# Patient Record
Sex: MALE | Race: WHITE | Employment: OTHER | URBAN - METROPOLITAN AREA
[De-identification: names, ages, dates, MRNs, and addresses within clinical notes are randomized per-mention and may not be internally consistent; named-entity substitution may affect disease eponyms.]

---

## 2017-05-26 ENCOUNTER — OFFICE VISIT (OUTPATIENT)
Dept: INTERNAL MEDICINE CLINIC | Age: 52
End: 2017-05-26

## 2017-05-26 VITALS
OXYGEN SATURATION: 98 % | DIASTOLIC BLOOD PRESSURE: 80 MMHG | SYSTOLIC BLOOD PRESSURE: 132 MMHG | BODY MASS INDEX: 31.32 KG/M2 | RESPIRATION RATE: 14 BRPM | TEMPERATURE: 98.4 F | WEIGHT: 244 LBS | HEART RATE: 80 BPM | HEIGHT: 74 IN

## 2017-05-26 DIAGNOSIS — Z87.39 HISTORY OF GOUT: ICD-10-CM

## 2017-05-26 DIAGNOSIS — E78.5 HYPERLIPIDEMIA LDL GOAL <130: ICD-10-CM

## 2017-05-26 DIAGNOSIS — I10 ESSENTIAL HYPERTENSION WITH GOAL BLOOD PRESSURE LESS THAN 140/90: Primary | ICD-10-CM

## 2017-05-26 RX ORDER — NIFEDIPINE 60 MG/1
60 TABLET, EXTENDED RELEASE ORAL DAILY
Qty: 30 TAB | Refills: 3 | Status: SHIPPED | OUTPATIENT
Start: 2017-05-26 | End: 2017-06-16 | Stop reason: DRUGHIGH

## 2017-05-26 RX ORDER — INDOMETHACIN 50 MG/1
CAPSULE ORAL
Qty: 90 CAP | Refills: 1 | Status: SHIPPED | OUTPATIENT
Start: 2017-05-26 | End: 2018-05-23 | Stop reason: SDUPTHER

## 2017-05-26 RX ORDER — LISINOPRIL 20 MG/1
TABLET ORAL
Qty: 90 TAB | Refills: 3 | Status: SHIPPED | OUTPATIENT
Start: 2017-05-26 | End: 2018-08-06 | Stop reason: SDUPTHER

## 2017-05-26 RX ORDER — CYCLOBENZAPRINE HCL 10 MG
10 TABLET ORAL
Qty: 60 TAB | Refills: 0 | Status: SHIPPED | OUTPATIENT
Start: 2017-05-26 | End: 2017-06-16 | Stop reason: ALTCHOICE

## 2017-05-26 RX ORDER — AMLODIPINE BESYLATE 10 MG/1
10 TABLET ORAL DAILY
Qty: 90 TAB | Refills: 3 | Status: CANCELLED | OUTPATIENT
Start: 2017-05-26

## 2017-05-26 NOTE — PROGRESS NOTES
1. Have you been to the ER, urgent care clinic or hospitalized since your last visit? YES. [facility] on 8/24/2016 for Rib pain on right side. 2. Have you seen or consulted any other health care providers outside of the [address] [place] [address] since your last visit (Include any pap smears or colon screening)? NO      Do you have an Advanced Directive? YES     Would you like information on Advanced Directives?  NO

## 2017-05-26 NOTE — PROGRESS NOTES
Rayne Tesfaye III,born 1965, is a 46 y.o. male, who is seen today for evaluation of rising blood pressure, gout, obesity. He has a great job driving which pacing very well but his blood pressure was 160/100 during a recent evaluation and if his blood pressure is not below 140/90 by August 15 he will lose that job. He is a little stressed over that possibility, he has a 8year-old child at home and certainly hopes to work many more years at this job. In the morning his blood pressure is often higher than it is in the afternoon and when he has checked it in the mornings he is getting in the 150s often and in the afternoon generally in the 130s over 80 or so. He brings in numerous readings today for me to review. He has been forgetting to take his medicine some of the time and had been changing the time of day he has taken medicine, sometimes 1 pill in the morning and the other in the evening but more recently he is taking lisinopril and amlodipine in the morning. He says he could do better with salt reduction. He also has a history of gout and every few months he gets an episode of gout in the dorsum of the foot and anterior ankle are typically on the left side but every 2-3 weeks he is getting pain in the wrist that he thinks is gout. He uses indomethacin when that occurs. He also periodically gets stiffness in his neck and has used Flexeril in the past but has run out and last refill that in 2012. He would like some more. He was supposed to come back for physical more than 6 months ago but has not been seen for a year now. Past Medical History:   Diagnosis Date    Hypertension      Current medication includes lisinopril 20 mg daily, amlodipine 10 mg daily, indomethacin 50 mg 3 times daily as needed gout.     Visit Vitals    /80    Pulse 80    Temp 98.4 °F (36.9 °C) (Oral)    Resp 14    Ht 6' 2\" (1.88 m)    Wt 244 lb (110.7 kg)    SpO2 98%    BMI 31.33 kg/m2     The above blood pressure was taken on the right side but it is about the same on the left. Carotids are 2+ without bruits. No adenopathy or thyromegaly. Lungs are clear to percussion. Good breath sounds with no wheezing or crackles. Heart reveals a regular rhythm with normal S1 and S2 no murmur gallop click or rub. Apical impulse is not palpable. Abdomen is soft and nontender, slightly obese, no obvious hepatosplenomegaly or masses and there are no bruits. Extremities reveal no clubbing cyanosis or edema. Pulses are 2+. No joint deformities or tenderness. Effusion. Assessment: #1.  Labile hypertension generally running quite a bit higher in the mornings than in the afternoons. He needs to have well-controlled blood pressure at all times to continue his current employment. He needs to be at least below 140/90. At this point he will continue lisinopril 20 mg daily and will discontinue amlodipine and start on nifedipine 60 mg daily. He will return for a brief checkup in 3 weeks. #2.  Fairly frequent episodes of apparent gout. We will avoid diuretics if possible. Our lab is closed late this afternoon so we will check uric acid level when he returns in about 3 weeks. He will continue to use indomethacin as needed in the meantime. #3.  Obesity, unchanged from a year ago with body mass index of 31.3. He understands that he really needs to cut back on calories especially starches and try to lose a little of this weight is a long-term goal.    Follow-up 3 weeks we will check uric acid level at that time we will increase nifedipine if needed at that time and he will continue checking blood pressures at home and bring those with him. If he does not have gout we actually could start hydrochlorothiazide but he probably has gout. Petros Gonzales MD FACP    Please note: This document has been produced using voice recognition software. Unrecognized errors in transcription may be present.

## 2017-05-26 NOTE — MR AVS SNAPSHOT
Visit Information Date & Time Provider Department Dept. Phone Encounter #  
 5/26/2017  4:00 PM Fred Barclay MD Internist of [address] [id number] Follow-up Instructions Return in about 3 weeks (around 6/16/2017). Your Appointments 6/16/2017  3:30 PM  
Office Visit with Fred Barclay MD  
Internist of [facility]) Appt Note: ov 3wks   
 [address] [facility] [address] [address] [facility]  
  
   
 [address], [address] Upcoming Health Maintenance Date Due Hepatitis C Screening 1965 Pneumococcal 19-64 Medium Risk (1 of 1 - PPSV23) 12/29/1984 DTaP/Tdap/Td series (1 - Tdap) 12/29/1986 FOBT Q 1 YEAR AGE 50-75 12/29/2015 INFLUENZA AGE 9 TO ADULT 8/1/2017 Allergies as of 5/26/2017  Review Complete On: 5/26/2017 By: Fred Barclay MD  
  
 Severity Noted Reaction Type Reactions Amoxicillin High 02/25/2015    Anaphylaxis Pcn [Penicillins]    Anaphylaxis As a child Current Immunizations  Never Reviewed No immunizations on file. Not reviewed this visit You Were Diagnosed With   
  
 Codes Comments Essential hypertension with goal blood pressure less than 140/90    -  Primary ICD-10-CM: I10 
ICD-9-CM: 401.9 History of gout     ICD-10-CM: Z87.39 
ICD-9-CM: V12.29 Hyperlipidemia LDL goal <130     ICD-10-CM: E78.5 ICD-9-CM: 272.4 Vitals BP Pulse Temp Resp Height(growth percentile) Weight(growth percentile) 132/80 80 98.4 °F (36.9 °C) (Oral) 14 6' 2\" (1.88 m) 244 lb (110.7 kg) SpO2 BMI Smoking Status 98% 31.33 kg/m2 Current Every Day Smoker Vitals History BMI and BSA Data Body Mass Index Body Surface Area  
 31.33 kg/m 2 2.4 m 2 Preferred Pharmacy Pharmacy Name Phone [facility] [address] [place] [facility] Your Updated Medication List  
  
   
This list is accurate as of: 5/26/17  4:56 PM.  Always use your most recent med list.  
  
  
  
  
 acetaminophen-codeine 300-30 mg per tablet Commonly known as:  TYLENOL-CODEINE #3 Take 1-2 tablets Q4-6Hrs PRN for pain  Indications: PAIN  
  
 cyclobenzaprine 10 mg tablet Commonly known as:  FLEXERIL Take 1 Tab by mouth three (3) times daily as needed for Muscle Spasm(s). indomethacin 50 mg capsule Commonly known as:  INDOCIN  
TAKE ONE CAPSULE BY MOUTH THREE TIMES DAILY AS NEEDED FOR  GOUT  
  
 lisinopril 20 mg tablet Commonly known as:  PRINIVIL, ZESTRIL  
TAKE ONE TABLET BY MOUTH ONCE DAILY  
  
 NIFEdipine ER 60 mg ER tablet Commonly known as:  PROCARDIA XL Take 1 Tab by mouth daily. Prescriptions Printed Refills  
 indomethacin (INDOCIN) 50 mg capsule 1 Sig: TAKE ONE CAPSULE BY MOUTH THREE TIMES DAILY AS NEEDED FOR  GOUT Class: Print  
 lisinopril (PRINIVIL, ZESTRIL) 20 mg tablet 3 Sig: TAKE ONE TABLET BY MOUTH ONCE DAILY Class: Print NIFEdipine ER (PROCARDIA XL) 60 mg ER tablet 3 Sig: Take 1 Tab by mouth daily. Class: Print Route: Oral  
 cyclobenzaprine (FLEXERIL) 10 mg tablet 0 Sig: Take 1 Tab by mouth three (3) times daily as needed for Muscle Spasm(s). Class: Print Route: Oral  
  
Follow-up Instructions Return in about 3 weeks (around 6/16/2017). Introducing [facility] & HEALTH SERVICES! Tanis Epley introduces Estately patient portal. Now you can access parts of your medical record, email your doctor's office, and request medication refills online. 1. In your internet browser, go to [email or web address] 2. Click on the First Time User? Click Here link in the Sign In box. You will see the New Member Sign Up page. 3. Enter your Estately Access Code exactly as it appears below. You will not need to use this code after youve completed the sign-up process.  If you do not sign up before the expiration date, you must request a new code. · BetaVersity Access Code: [email or web address] Expires: 8/24/2017  3:44 PM 
 
4. Enter the last four digits of your Social Security Number (xxxx) and Date of Birth (mm/dd/yyyy) as indicated and click Submit. You will be taken to the next sign-up page. 5. Create a BetaVersity ID. This will be your BetaVersity login ID and cannot be changed, so think of one that is secure and easy to remember. 6. Create a BetaVersity password. You can change your password at any time. 7. Enter your Password Reset Question and Answer. This can be used at a later time if you forget your password. 8. Enter your e-mail address. You will receive e-mail notification when new information is available in [address] 9. Click Sign Up. You can now view and download portions of your medical record. 10. Click the Download Summary menu link to download a portable copy of your medical information. If you have questions, please visit the Frequently Asked Questions section of the BetaVersity website. Remember, BetaVersity is NOT to be used for urgent needs. For medical emergencies, dial 911. Now available from your iPhone and Android! Please provide this summary of care documentation to your next provider. Your primary care clinician is listed as Starr Mcgee. If you have any questions after today's visit, please call [phone].

## 2017-06-16 ENCOUNTER — OFFICE VISIT (OUTPATIENT)
Dept: INTERNAL MEDICINE CLINIC | Age: 52
End: 2017-06-16

## 2017-06-16 VITALS
RESPIRATION RATE: 14 BRPM | OXYGEN SATURATION: 98 % | WEIGHT: 244 LBS | TEMPERATURE: 98 F | HEART RATE: 76 BPM | DIASTOLIC BLOOD PRESSURE: 78 MMHG | SYSTOLIC BLOOD PRESSURE: 130 MMHG | BODY MASS INDEX: 31.32 KG/M2 | HEIGHT: 74 IN

## 2017-06-16 DIAGNOSIS — Z87.39 HISTORY OF GOUT: ICD-10-CM

## 2017-06-16 DIAGNOSIS — E78.5 HYPERLIPIDEMIA LDL GOAL <130: ICD-10-CM

## 2017-06-16 DIAGNOSIS — I10 ESSENTIAL HYPERTENSION WITH GOAL BLOOD PRESSURE LESS THAN 140/90: Primary | ICD-10-CM

## 2017-06-16 RX ORDER — NIFEDIPINE 90 MG/1
90 TABLET, EXTENDED RELEASE ORAL DAILY
Qty: 30 TAB | Refills: 5 | Status: SHIPPED | OUTPATIENT
Start: 2017-06-16 | End: 2017-07-28 | Stop reason: ALTCHOICE

## 2017-06-16 NOTE — PROGRESS NOTES
Jasson Centeno III,born 1965, is a 46 y.o. male, who is seen today for reevaluation of hypertension obesity and history of gout. He is pretty sure he has had gout and has used indomethacin when needed for that. Usually it affects his wrist particularly the left wrist but occasionally his ankle. He is concerned about his blood pressure and shows me numerous readings from home where he has gotten readings as high as 571 systolic and as high as 544 diastolic and only occasional readings into the normal range with systolic and diastolic. He still enjoys evenings with friends and will drink 12 beers at a time on those evenings, approximately twice a week. I asked him to cut way back on his alcohol before. Explained the reasons why. He does take his blood pressure medicine regularly. He is mildly obese and that has not changed. He is quite concerned about his blood pressure and wants better treatment and early follow-up. Past Medical History:   Diagnosis Date    Hypertension      Current Outpatient Prescriptions   Medication Sig Dispense Refill    indomethacin (INDOCIN) 50 mg capsule TAKE ONE CAPSULE BY MOUTH THREE TIMES DAILY AS NEEDED FOR  GOUT 90 Cap 1    lisinopril (PRINIVIL, ZESTRIL) 20 mg tablet TAKE ONE TABLET BY MOUTH ONCE DAILY 90 Tab 3    NIFEdipine ER (PROCARDIA XL) 60 mg ER tablet Take 1 Tab by mouth daily. 30 Tab 3     Visit Vitals    /78    Pulse 76    Temp 98 °F (36.7 °C) (Oral)    Resp 14    Ht 6' 2\" (1.88 m)    Wt 244 lb (110.7 kg)    SpO2 98%    BMI 31.33 kg/m2     Carotids are 2+ without bruits. No adenopathy or thyromegaly. Lungs are clear to percussion. Good breath sounds with no wheezing or crackles. Heart reveals a regular rhythm with normal S1 and S2 no murmur gallop click or rub. Abdomen is slightly obese soft nontender with no obvious hepatosplenomegaly or masses and no bruits. Extremities reveal no clubbing cyanosis or edema. No active joints.   Pulses are 2+ throughout. Assessment: #1.  Labile hypertension probably substantially affected by excessive alcohol intake as I explained to him again today. I told him he really should not drink more than 6 pack on any given night and preferably even less than that. He should not be drinking more than 14 beers per week altogether. He indicates he understands but does not indicate that he will change his drinking habits. At this point he will increase nifedipine to 90 mg daily and continue lisinopril 20 mg daily. If gout was not a problem we certainly could institute chlorthalidone. #2.  Excessive alcohol intake in the form of beer as above, I have recommended as noted above, that he not drink more than 6 beers at a time and preferably not more than 14 a week. If he could drink 2-4 beers maximum his blood pressure likely would be a lot better. He seems to understand this. #3.  Mild obesity, cutting back on beer will help and cutting back on starches and fast food is also a good idea. #4.  History of probable gout. Follow-up in 3 weeks with [facility] SERG Cannon MD FACP    Please note: This document has been produced using voice recognition software. Unrecognized errors in transcription may be present.

## 2017-06-16 NOTE — MR AVS SNAPSHOT
Visit Information Date & Time Provider Department Dept. Phone Encounter #  
 6/16/2017  3:30 PM Kyler Ramos MD Internist of [address] [id number] Upcoming Health Maintenance Date Due Hepatitis C Screening 1965 Pneumococcal 19-64 Medium Risk (1 of 1 - PPSV23) 12/29/1984 DTaP/Tdap/Td series (1 - Tdap) 12/29/1986 FOBT Q 1 YEAR AGE 50-75 12/29/2015 INFLUENZA AGE 9 TO ADULT 8/1/2017 Allergies as of 6/16/2017  Review Complete On: 6/16/2017 By: Kyler Ramos MD  
  
 Severity Noted Reaction Type Reactions Amoxicillin High 02/25/2015    Anaphylaxis Pcn [Penicillins]    Anaphylaxis As a child Current Immunizations  Never Reviewed No immunizations on file. Not reviewed this visit You Were Diagnosed With   
  
 Codes Comments Essential hypertension with goal blood pressure less than 140/90    -  Primary ICD-10-CM: I10 
ICD-9-CM: 401.9 Hyperlipidemia LDL goal <130     ICD-10-CM: E78.5 ICD-9-CM: 272.4 History of gout     ICD-10-CM: Z87.39 
ICD-9-CM: V12.29 Vitals BP Pulse Temp Resp Height(growth percentile) Weight(growth percentile) 130/78 76 98 °F (36.7 °C) (Oral) 14 6' 2\" (1.88 m) 244 lb (110.7 kg) SpO2 BMI Smoking Status 98% 31.33 kg/m2 Current Every Day Smoker Vitals History BMI and BSA Data Body Mass Index Body Surface Area  
 31.33 kg/m 2 2.4 m 2 Preferred Pharmacy Pharmacy Name Phone [facility] [address] [place], [address] Your Updated Medication List  
  
   
This list is accurate as of: 6/16/17  4:06 PM.  Always use your most recent med list.  
  
  
  
  
 indomethacin 50 mg capsule Commonly known as:  INDOCIN  
TAKE ONE CAPSULE BY MOUTH THREE TIMES DAILY AS NEEDED FOR  GOUT  
  
 lisinopril 20 mg tablet Commonly known as:  PRINIVIL, ZESTRIL  
TAKE ONE TABLET BY MOUTH ONCE DAILY NIFEdipine ER 90 mg ER tablet Commonly known as:  PROCARDIA XL Take 1 Tab by mouth daily. Prescriptions Printed Refills NIFEdipine ER (PROCARDIA XL) 90 mg ER tablet 5 Sig: Take 1 Tab by mouth daily. Class: Print Route: Oral  
  
Introducing [facility] & [facility] SERVICES! Jethro Melgoza introduces Diagnoplex patient portal. Now you can access parts of your medical record, email your doctor's office, and request medication refills online. 1. In your internet browser, go to [email or web address] 2. Click on the First Time User? Click Here link in the Sign In box. You will see the New Member Sign Up page. 3. Enter your Diagnoplex Access Code exactly as it appears below. You will not need to use this code after youve completed the sign-up process. If you do not sign up before the expiration date, you must request a new code. · Diagnoplex Access Code: [email or web address] Expires: 8/24/2017  3:44 PM 
 
4. Enter the last four digits of your Social Security Number (xxxx) and Date of Birth (mm/dd/yyyy) as indicated and click Submit. You will be taken to the next sign-up page. 5. Create a Diagnoplex ID. This will be your Diagnoplex login ID and cannot be changed, so think of one that is secure and easy to remember. 6. Create a Diagnoplex password. You can change your password at any time. 7. Enter your Password Reset Question and Answer. This can be used at a later time if you forget your password. 8. Enter your e-mail address. You will receive e-mail notification when new information is available in [address] 9. Click Sign Up. You can now view and download portions of your medical record. 10. Click the Download Summary menu link to download a portable copy of your medical information. If you have questions, please visit the Frequently Asked Questions section of the Diagnoplex website. Remember, Diagnoplex is NOT to be used for urgent needs. For medical emergencies, dial 911. Now available from your iPhone and Android! Please provide this summary of care documentation to your next provider. Your primary care clinician is listed as Warner Chance. Tucker Rogers. If you have any questions after today's visit, please call [phone].

## 2017-06-16 NOTE — PROGRESS NOTES
1. Have you been to the ER, urgent care clinic or hospitalized since your last visit? NO.     2. Have you seen or consulted any other health care providers outside of the [address] [place] [address] since your last visit (Include any pap smears or colon screening)? NO      Do you have an Advanced Directive? NO    Would you like information on Advanced Directives?  YES

## 2017-06-30 DIAGNOSIS — Z87.39 HISTORY OF GOUT: ICD-10-CM

## 2017-06-30 DIAGNOSIS — I10 ESSENTIAL HYPERTENSION WITH GOAL BLOOD PRESSURE LESS THAN 140/90: ICD-10-CM

## 2017-06-30 DIAGNOSIS — E78.5 HYPERLIPIDEMIA LDL GOAL <130: ICD-10-CM

## 2017-07-14 ENCOUNTER — OFFICE VISIT (OUTPATIENT)
Dept: INTERNAL MEDICINE CLINIC | Age: 52
End: 2017-07-14

## 2017-07-14 VITALS
RESPIRATION RATE: 14 BRPM | SYSTOLIC BLOOD PRESSURE: 130 MMHG | HEART RATE: 77 BPM | BODY MASS INDEX: 31.16 KG/M2 | WEIGHT: 242.8 LBS | DIASTOLIC BLOOD PRESSURE: 90 MMHG | TEMPERATURE: 98.3 F | OXYGEN SATURATION: 98 % | HEIGHT: 74 IN

## 2017-07-14 DIAGNOSIS — I10 ESSENTIAL HYPERTENSION WITH GOAL BLOOD PRESSURE LESS THAN 140/90: Primary | ICD-10-CM

## 2017-07-14 RX ORDER — HYDROCHLOROTHIAZIDE 25 MG/1
25 TABLET ORAL DAILY
Qty: 90 TAB | Refills: 3 | Status: SHIPPED | OUTPATIENT
Start: 2017-07-14 | End: 2018-06-01 | Stop reason: DRUGHIGH

## 2017-07-14 NOTE — PROGRESS NOTES
Dudley Panchal III,born 1965, is a 46 y.o. male, who is seen today for reevaluation of hypertension. He needs to get his blood pressure down so he can pass the [facility] test August 8. He was supposed to have lab work done for this visit but has been busy, [place]. He is here with his daughter and wife today. When he checks his blood pressure at home it is still going high though typically drops 10 points if he rechecks it a few minutes later. He is getting readings well up into the 150s over 95 - 100 typically. Blood pressure was 140/104 with his meter just now. He feels well and is cut back on his alcohol intake. Past Medical History:   Diagnosis Date    Hypertension      Current Outpatient Prescriptions   Medication Sig Dispense Refill    NIFEdipine ER (PROCARDIA XL) 90 mg ER tablet Take 1 Tab by mouth daily. 30 Tab 5    lisinopril (PRINIVIL, ZESTRIL) 20 mg tablet TAKE ONE TABLET BY MOUTH ONCE DAILY 90 Tab 3    indomethacin (INDOCIN) 50 mg capsule TAKE ONE CAPSULE BY MOUTH THREE TIMES DAILY AS NEEDED FOR  GOUT 90 Cap 1     Visit Vitals    /90 (BP 1 Location: Left arm, BP Patient Position: Sitting)    Pulse 77    Temp 98.3 °F (36.8 °C) (Oral)    Resp 14    Ht 6' 2\" (1.88 m)    Wt 242 lb 12.8 oz (110.1 kg)    SpO2 98%    BMI 31.17 kg/m2     In the right arm blood pressure was 128/90. Lungs are clear to percussion. Good breath sounds with no wheezing or crackles. Heart reveals a regular rhythm with normal S1 and S2 no murmur gallop click or rub. Extremities reveal no clubbing cyanosis or edema. Assessment: #1. Hypertension exacerbated by beer intake, he has cut back somewhat over the last 3 weeks. He did not tell me exactly what he is drinking now. He will continue nifedipine 90 mg daily, lisinopril 20 mg daily and he will be started on hydrochlorothiazide 25 mg daily.   He will have lab done when he gets back from vacation in about 10 days and will plan follow-up for 2 weeks from now. The lab will include uric acid to see if we can reasonably use hydrochlorothiazide alone or if we need to add allopurinol to avoid gout which he has had in the past.    Phani Marion. Марина Ortiz MD FACP    Please note: This document has been produced using voice recognition software. Unrecognized errors in transcription may be present.

## 2017-07-14 NOTE — MR AVS SNAPSHOT
Visit Information Date & Time Provider Department Dept. Phone Encounter #  
 7/14/2017  4:00 PM Anika Pate MD Internist of [address] [id number] Your Appointments 7/28/2017  4:00 PM  
Office Visit with Anika Pate MD  
Internist of [facility]) Appt Note: ov per rm  
 [address], [address] [place] [address] [place]  
  
   
 [address], [address] Upcoming Health Maintenance Date Due Hepatitis C Screening 1965 Pneumococcal 19-64 Medium Risk (1 of 1 - PPSV23) 12/29/1984 DTaP/Tdap/Td series (1 - Tdap) 12/29/1986 FOBT Q 1 YEAR AGE 50-75 12/29/2015 INFLUENZA AGE 9 TO ADULT 8/1/2017 Allergies as of 7/14/2017  Review Complete On: 7/14/2017 By: Anika Pate MD  
  
 Severity Noted Reaction Type Reactions Amoxicillin High 02/25/2015    Anaphylaxis Pcn [Penicillins]    Anaphylaxis As a child Current Immunizations  Never Reviewed No immunizations on file. Not reviewed this visit Vitals BP Pulse Temp Height(growth percentile) Weight(growth percentile) SpO2  
 (!) 146/102 (BP 1 Location: Left arm, BP Patient Position: Sitting) 77 98.3 °F (36.8 °C) (Oral) 6' 2\" (1.88 m) 242 lb 12.8 oz (110.1 kg) 98% BMI Smoking Status 31.17 kg/m2 Current Every Day Smoker Vitals History BMI and BSA Data Body Mass Index Body Surface Area  
 31.17 kg/m 2 2.4 m 2 Preferred Pharmacy Pharmacy Name Phone [facility] [address] [facility] Your Updated Medication List  
  
   
This list is accurate as of: 7/14/17  4:44 PM.  Always use your most recent med list.  
  
  
  
  
 hydroCHLOROthiazide 25 mg tablet Commonly known as:  HYDRODIURIL Take 1 Tab by mouth daily. indomethacin 50 mg capsule Commonly known as:  INDOCIN  
 TAKE ONE CAPSULE BY MOUTH THREE TIMES DAILY AS NEEDED FOR  GOUT  
  
 lisinopril 20 mg tablet Commonly known as:  PRINIVIL, ZESTRIL  
TAKE ONE TABLET BY MOUTH ONCE DAILY  
  
 NIFEdipine ER 90 mg ER tablet Commonly known as:  PROCARDIA XL Take 1 Tab by mouth daily. Prescriptions Printed Refills  
 hydroCHLOROthiazide (HYDRODIURIL) 25 mg tablet 3 Sig: Take 1 Tab by mouth daily. Class: Print Route: Oral  
  
Introducing [facility] & [facility] SERVICES! Priscilla Sandoval introduces QuantaLife patient portal. Now you can access parts of your medical record, email your doctor's office, and request medication refills online. 1. In your internet browser, go to [email or web address] 2. Click on the First Time User? Click Here link in the Sign In box. You will see the New Member Sign Up page. 3. Enter your QuantaLife Access Code exactly as it appears below. You will not need to use this code after youve completed the sign-up process. If you do not sign up before the expiration date, you must request a new code. · QuantaLife Access Code: [email or web address] Expires: 8/24/2017  3:44 PM 
 
4. Enter the last four digits of your Social Security Number (xxxx) and Date of Birth (mm/dd/yyyy) as indicated and click Submit. You will be taken to the next sign-up page. 5. Create a QuantaLife ID. This will be your QuantaLife login ID and cannot be changed, so think of one that is secure and easy to remember. 6. Create a QuantaLife password. You can change your password at any time. 7. Enter your Password Reset Question and Answer. This can be used at a later time if you forget your password. 8. Enter your e-mail address. You will receive e-mail notification when new information is available in [address] 9. Click Sign Up. You can now view and download portions of your medical record. 10. Click the Download Summary menu link to download a portable copy of your medical information. If you have questions, please visit the Frequently Asked Questions section of the EventWitht website. Remember, Bizen is NOT to be used for urgent needs. For medical emergencies, dial 911. Now available from your iPhone and Android! Please provide this summary of care documentation to your next provider. Your primary care clinician is listed as Martin Molina. If you have any questions after today's visit, please call [phone].

## 2017-07-24 ENCOUNTER — HOSPITAL ENCOUNTER (OUTPATIENT)
Dept: LAB | Age: 52
Discharge: HOME | End: 2017-07-24
Payer: SELF-PAY

## 2017-07-24 LAB
ALBUMIN SERPL BCP-MCNC: 4.2 G/DL (ref 3.4–5)
ALBUMIN/GLOB SERPL: 1.1 {RATIO} (ref 0.8–1.7)
ALP SERPL-CCNC: 75 U/L (ref 45–117)
ALT SERPL-CCNC: 47 U/L (ref 16–61)
ANION GAP BLD CALC-SCNC: 10 MMOL/L (ref 3–18)
AST SERPL W P-5'-P-CCNC: 22 U/L (ref 15–37)
BASOPHILS # BLD AUTO: 0 K/UL (ref 0–0.1)
BASOPHILS # BLD: 1 % (ref 0–2)
BILIRUB SERPL-MCNC: 0.5 MG/DL (ref 0.2–1)
BUN SERPL-MCNC: 21 MG/DL (ref 7–18)
BUN/CREAT SERPL: 17 (ref 12–20)
CALCIUM SERPL-MCNC: 9.1 MG/DL (ref 8.5–10.1)
CHLORIDE SERPL-SCNC: 97 MMOL/L (ref 100–108)
CHOLEST SERPL-MCNC: 238 MG/DL
CO2 SERPL-SCNC: 25 MMOL/L (ref 21–32)
CREAT SERPL-MCNC: 1.26 MG/DL (ref 0.6–1.3)
DIFFERENTIAL METHOD BLD: ABNORMAL
EOSINOPHIL # BLD: 0.3 K/UL (ref 0–0.4)
EOSINOPHIL NFR BLD: 7 % (ref 0–5)
ERYTHROCYTE [DISTWIDTH] IN BLOOD BY AUTOMATED COUNT: 13.4 % (ref 11.6–14.5)
GLOBULIN SER CALC-MCNC: 3.8 G/DL (ref 2–4)
GLUCOSE SERPL-MCNC: 102 MG/DL (ref 74–99)
HCT VFR BLD AUTO: 43.1 % (ref 36–48)
HDLC SERPL-MCNC: 32 MG/DL (ref 40–60)
HDLC SERPL: 7.4 {RATIO} (ref 0–5)
HGB BLD-MCNC: 14.7 G/DL (ref 13–16)
LDLC SERPL CALC-MCNC: ABNORMAL MG/DL (ref 0–100)
LIPID PROFILE,FLP: ABNORMAL
LYMPHOCYTES # BLD AUTO: 27 % (ref 21–52)
LYMPHOCYTES # BLD: 1.2 K/UL (ref 0.9–3.6)
MCH RBC QN AUTO: 31.5 PG (ref 24–34)
MCHC RBC AUTO-ENTMCNC: 34.1 G/DL (ref 31–37)
MCV RBC AUTO: 92.5 FL (ref 74–97)
MONOCYTES # BLD: 0.7 K/UL (ref 0.05–1.2)
MONOCYTES NFR BLD AUTO: 15 % (ref 3–10)
NEUTS SEG # BLD: 2.3 K/UL (ref 1.8–8)
NEUTS SEG NFR BLD AUTO: 50 % (ref 40–73)
PLATELET # BLD AUTO: 268 K/UL (ref 135–420)
PMV BLD AUTO: 9.9 FL (ref 9.2–11.8)
POTASSIUM SERPL-SCNC: 4.4 MMOL/L (ref 3.5–5.5)
PROT SERPL-MCNC: 8 G/DL (ref 6.4–8.2)
RBC # BLD AUTO: 4.66 M/UL (ref 4.7–5.5)
SODIUM SERPL-SCNC: 132 MMOL/L (ref 136–145)
T4 FREE SERPL-MCNC: 1.2 NG/DL (ref 0.7–1.5)
TRIGL SERPL-MCNC: 722 MG/DL (ref ?–150)
TSH SERPL DL<=0.05 MIU/L-ACNC: 2.7 UIU/ML (ref 0.36–3.74)
URATE SERPL-MCNC: 10.5 MG/DL (ref 2.6–7.2)
VLDLC SERPL CALC-MCNC: ABNORMAL MG/DL
WBC # BLD AUTO: 4.6 K/UL (ref 4.6–13.2)

## 2017-07-24 PROCEDURE — 84439 ASSAY OF FREE THYROXINE: CPT | Performed by: INTERNAL MEDICINE

## 2017-07-24 PROCEDURE — 36415 COLL VENOUS BLD VENIPUNCTURE: CPT | Performed by: INTERNAL MEDICINE

## 2017-07-24 PROCEDURE — 84443 ASSAY THYROID STIM HORMONE: CPT | Performed by: INTERNAL MEDICINE

## 2017-07-24 PROCEDURE — 85025 COMPLETE CBC W/AUTO DIFF WBC: CPT | Performed by: INTERNAL MEDICINE

## 2017-07-24 PROCEDURE — 84550 ASSAY OF BLOOD/URIC ACID: CPT | Performed by: INTERNAL MEDICINE

## 2017-07-24 PROCEDURE — 80053 COMPREHEN METABOLIC PANEL: CPT | Performed by: INTERNAL MEDICINE

## 2017-07-24 PROCEDURE — 80061 LIPID PANEL: CPT | Performed by: INTERNAL MEDICINE

## 2017-07-28 ENCOUNTER — OFFICE VISIT (OUTPATIENT)
Dept: INTERNAL MEDICINE CLINIC | Age: 52
End: 2017-07-28

## 2017-07-28 VITALS
HEIGHT: 74 IN | WEIGHT: 240.6 LBS | SYSTOLIC BLOOD PRESSURE: 102 MMHG | TEMPERATURE: 97.6 F | RESPIRATION RATE: 12 BRPM | OXYGEN SATURATION: 99 % | HEART RATE: 76 BPM | DIASTOLIC BLOOD PRESSURE: 68 MMHG | BODY MASS INDEX: 30.88 KG/M2

## 2017-07-28 DIAGNOSIS — E78.5 HYPERLIPIDEMIA LDL GOAL <130: ICD-10-CM

## 2017-07-28 DIAGNOSIS — Z87.39 HISTORY OF GOUT: ICD-10-CM

## 2017-07-28 DIAGNOSIS — I10 ESSENTIAL HYPERTENSION WITH GOAL BLOOD PRESSURE LESS THAN 140/90: Primary | ICD-10-CM

## 2017-07-28 NOTE — PROGRESS NOTES
Yordy Leal III,born 1965, is a 46 y.o. male, who is seen today for reevaluation of hypertension. He has been drinking over a gallon of water per day, has not had any beer for a week, and finds blood pressure was actually a little too low about a week ago when he felt lightheaded so he stopped using nifedipine but he has continued his lisinopril and hydrochlorothiazide. He has a little discomfort in his left wrist today and he thinks it might be gout. He has allopurinol at home but has not used any yet. 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 Past Medical History:   Diagnosis Date    Hypertension      Current Outpatient Prescriptions   Medication Sig Dispense Refill    hydroCHLOROthiazide (HYDRODIURIL) 25 mg tablet Take 1 Tab by mouth daily. 90 Tab 3    indomethacin (INDOCIN) 50 mg capsule TAKE ONE CAPSULE BY MOUTH THREE TIMES DAILY AS NEEDED FOR  GOUT 90 Cap 1    lisinopril (PRINIVIL, ZESTRIL) 20 mg tablet TAKE ONE TABLET BY MOUTH ONCE DAILY 90 Tab 3     Visit Vitals    /68    Pulse 76    Temp 97.6 °F (36.4 °C) (Oral)    Resp 12    Ht 6' 2\" (1.88 m)    Wt 240 lb 9.6 oz (109.1 kg)    SpO2 99%    BMI 30.89 kg/m2     Results for orders placed or performed during the hospital encounter of 07/24/17   CBC WITH AUTOMATED DIFF   Result Value Ref Range    WBC 4.6 4.6 - 13.2 K/uL    RBC 4.66 (L) 4.70 - 5.50 M/uL    HGB 14.7 13.0 - 16.0 g/dL    HCT 43.1 36.0 - 48.0 %    MCV 92.5 74.0 - 97.0 FL    MCH 31.5 24.0 - 34.0 PG    MCHC 34.1 31.0 - 37.0 g/dL    RDW 13.4 11.6 - 14.5 %    PLATELET 901 304 - 691 K/uL    MPV 9.9 9.2 - 11.8 FL    NEUTROPHILS 50 40 - 73 %    LYMPHOCYTES 27 21 - 52 %    MONOCYTES 15 (H) 3 - 10 %    EOSINOPHILS 7 (H) 0 - 5 %    BASOPHILS 1 0 - 2 %    ABS. NEUTROPHILS 2.3 1.8 - 8.0 K/UL    ABS. LYMPHOCYTES 1.2 0.9 - 3.6 K/UL    ABS. MONOCYTES 0.7 0.05 - 1.2 K/UL    ABS. EOSINOPHILS 0.3 0.0 - 0.4 K/UL    ABS.  BASOPHILS 0.0 0.0 - 0.1 K/UL    DF AUTOMATED     T4, FREE   Result Value Ref Range    T4, Free 1.2 0.7 - 1.5 NG/DL   LIPID PANEL   Result Value Ref Range    LIPID PROFILE          Cholesterol, total 238 (H) <200 MG/DL    Triglyceride 722 (H) <150 MG/DL    HDL Cholesterol 32 (L) 40 - 60 MG/DL    LDL, calculated  0 - 100 MG/DL     LDL AND VLDL CHOLESTEROL NOT CALCULATED WHEN TRIGLYCERIDES >400 MG/DL OR HDL CHOLESTEROL <20 MG/DL    VLDL, calculated  MG/DL     Calculation not valid with this patient's other Lipid values. CHOL/HDL Ratio 7.4 (H) 0 - 5.0     METABOLIC PANEL, COMPREHENSIVE   Result Value Ref Range    Sodium 132 (L) 136 - 145 mmol/L    Potassium 4.4 3.5 - 5.5 mmol/L    Chloride 97 (L) 100 - 108 mmol/L    CO2 25 21 - 32 mmol/L    Anion gap 10 3.0 - 18 mmol/L    Glucose 102 (H) 74 - 99 mg/dL    BUN 21 (H) 7.0 - 18 MG/DL    Creatinine 1.26 0.6 - 1.3 MG/DL    BUN/Creatinine ratio 17 12 - 20      GFR est AA >60 >60 ml/min/1.73m2    GFR est non-AA >60 >60 ml/min/1.73m2    Calcium 9.1 8.5 - 10.1 MG/DL    Bilirubin, total 0.5 0.2 - 1.0 MG/DL    ALT (SGPT) 47 16 - 61 U/L    AST (SGOT) 22 15 - 37 U/L    Alk. phosphatase 75 45 - 117 U/L    Protein, total 8.0 6.4 - 8.2 g/dL    Albumin 4.2 3.4 - 5.0 g/dL    Globulin 3.8 2.0 - 4.0 g/dL    A-G Ratio 1.1 0.8 - 1.7     TSH 3RD GENERATION   Result Value Ref Range    TSH 2.70 0.36 - 3.74 uIU/mL   URIC ACID   Result Value Ref Range    Uric acid 10.5 (H) 2.6 - 7.2 MG/DL     Assessment: #1. Hypertension is now very well controlled, much better than what he typically gets at home. For now we will continue current medication and do the DOT physical 1 week from today. After he passes the physical he will decrease hydrochlorothiazide to one half tablet daily and continue lisinopril. #2.  Gout with a uric acid level. At some point I really would like to get him back off hydrochlorothiazide, otherwise he is going to need allopurinol which we discussed at length today.   I will plan to see him back in 2 months, see how his blood pressure is doing, see whether he has had episodes of gout in the interim and that will inform decision on further medication. #3.  Drinking too much beer but none for the last week. I told him he could occasionally drink beer but if you can go to 3 weeks between drinking that would be best and not binge at those times either. He thinks he can do that. It is very important that he keep his job which pays very well but he needs to be able to drive. #4.  Mild hyponatremia, drinking too much water. He will cut back on his water drinking to no more than about 3 quarts per day. #5.  High triglycerides and only moderately elevated cholesterol, this is likely mainly related to his beer drinking. With drinking much less. This should improve and we can check that in several months time. He agrees with this plan. Follow-up 2 months    Petros León MD FACP    Please note: This document has been produced using voice recognition software. Unrecognized errors in transcription may be present.     This visit lasted 15 minutes, greater than 50% of the time was spent counseling

## 2017-07-28 NOTE — MR AVS SNAPSHOT
Visit Information Date & Time Provider Department Dept. Phone Encounter #  
 7/28/2017  4:00 PM Argentina Greene MD Internist of [address] [id number] Follow-up Instructions Return in about 2 months (around 9/28/2017). Your Appointments 9/29/2017  4:00 PM  
Office Visit with Argentina Greene MD  
Internist of [address] [facility]) Appt Note: 2 mos fu per [facility] [address] [address] [address]  
  
   
 [address] [facility] Upcoming Health Maintenance Date Due Hepatitis C Screening 1965 Pneumococcal 19-64 Medium Risk (1 of 1 - PPSV23) 12/29/1984 DTaP/Tdap/Td series (1 - Tdap) 12/29/1986 FOBT Q 1 YEAR AGE 50-75 12/29/2015 INFLUENZA AGE 9 TO ADULT 8/1/2017 Allergies as of 7/28/2017  Review Complete On: 7/28/2017 By: Argentina Greene MD  
  
 Severity Noted Reaction Type Reactions Amoxicillin High 02/25/2015    Anaphylaxis Pcn [Penicillins]    Anaphylaxis As a child Current Immunizations  Never Reviewed No immunizations on file. Not reviewed this visit Vitals BP Pulse Temp Resp Height(growth percentile) Weight(growth percentile) 102/68 76 97.6 °F (36.4 °C) (Oral) 12 6' 2\" (1.88 m) 240 lb 9.6 oz (109.1 kg) SpO2 BMI Smoking Status 99% 30.89 kg/m2 Current Every Day Smoker Vitals History BMI and BSA Data Body Mass Index Body Surface Area  
 30.89 kg/m 2 2.39 m 2 Preferred Pharmacy Pharmacy Name Phone [facility] [address] [place], [address] Your Updated Medication List  
  
   
This list is accurate as of: 7/28/17  4:23 PM.  Always use your most recent med list.  
  
  
  
  
 hydroCHLOROthiazide 25 mg tablet Commonly known as:  HYDRODIURIL Take 1 Tab by mouth daily. indomethacin 50 mg capsule Commonly known as:  INDOCIN  
TAKE ONE CAPSULE BY MOUTH THREE TIMES DAILY AS NEEDED FOR  GOUT  
  
 lisinopril 20 mg tablet Commonly known as:  PRINIVIL, ZESTRIL  
TAKE ONE TABLET BY MOUTH ONCE DAILY Follow-up Instructions Return in about 2 months (around 9/28/2017). Introducing [facility] & HEALTH SERVICES! [facility] introduces [employer] patient portal. Now you can access parts of your medical record, email your doctor's office, and request medication refills online. 1. In your internet browser, go to [email or web address] 2. Click on the First Time User? Click Here link in the Sign In box. You will see the New Member Sign Up page. 3. Enter your [employer] Access Code exactly as it appears below. You will not need to use this code after youve completed the sign-up process. If you do not sign up before the expiration date, you must request a new code. · [employer] Access Code: [email or web address] Expires: 8/24/2017  3:44 PM 
 
4. Enter the last four digits of your Social Security Number (xxxx) and Date of Birth (mm/dd/yyyy) as indicated and click Submit. You will be taken to the next sign-up page. 5. Create a [employer] ID. This will be your [employer] login ID and cannot be changed, so think of one that is secure and easy to remember. 6. Create a [employer] password. You can change your password at any time. 7. Enter your Password Reset Question and Answer. This can be used at a later time if you forget your password. 8. Enter your e-mail address. You will receive e-mail notification when new information is available in [address] 9. Click Sign Up. You can now view and download portions of your medical record. 10. Click the Download Summary menu link to download a portable copy of your medical information. If you have questions, please visit the Frequently Asked Questions section of the [employer] website.  Remember, [employer] is NOT to be used for urgent needs. For medical emergencies, dial 911. Now available from your iPhone and Android! Please provide this summary of care documentation to your next provider. Your primary care clinician is listed as Cecilia Fernandez. Karla Arguello. If you have any questions after today's visit, please call [phone].

## 2018-01-12 ENCOUNTER — OFFICE VISIT (OUTPATIENT)
Dept: INTERNAL MEDICINE CLINIC | Age: 53
End: 2018-01-12

## 2018-01-12 VITALS
WEIGHT: 249 LBS | TEMPERATURE: 97.6 F | SYSTOLIC BLOOD PRESSURE: 122 MMHG | HEIGHT: 74 IN | DIASTOLIC BLOOD PRESSURE: 94 MMHG | RESPIRATION RATE: 12 BRPM | HEART RATE: 70 BPM | OXYGEN SATURATION: 97 % | BODY MASS INDEX: 31.95 KG/M2

## 2018-01-12 DIAGNOSIS — J02.9 PHARYNGITIS, UNSPECIFIED ETIOLOGY: Primary | ICD-10-CM

## 2018-01-12 RX ORDER — HYDROCODONE BITARTRATE AND ACETAMINOPHEN 7.5; 325 MG/1; MG/1
1 TABLET ORAL
Qty: 15 TAB | Refills: 0 | Status: SHIPPED | OUTPATIENT
Start: 2018-01-12 | End: 2018-06-01 | Stop reason: ALTCHOICE

## 2018-01-12 RX ORDER — AZITHROMYCIN 250 MG/1
TABLET, FILM COATED ORAL
Qty: 6 TAB | Refills: 0 | Status: SHIPPED | OUTPATIENT
Start: 2018-01-12 | End: 2018-01-17

## 2018-01-12 NOTE — MR AVS SNAPSHOT
Visit Information Date & Time Provider Department Dept. Phone Encounter #  
 1/12/2018  4:00 PM Tayler Nava MD [facility] of [address] [place] [address] [phone] Upcoming Health Maintenance Date Due Hepatitis C Screening 1965 Pneumococcal 19-64 Medium Risk (1 of 1 - PPSV23) 12/29/1984 DTaP/Tdap/Td series (1 - Tdap) 12/29/1986 FOBT Q 1 YEAR AGE 50-75 12/29/2015 Influenza Age 5 to Adult 8/1/2017 Allergies as of 1/12/2018  Review Complete On: 1/12/2018 By: Tayler Nava MD  
  
 Severity Noted Reaction Type Reactions Amoxicillin High 02/25/2015    Anaphylaxis Pcn [Penicillins]    Anaphylaxis As a child Current Immunizations  Never Reviewed No immunizations on file. Not reviewed this visit You Were Diagnosed With   
  
 Codes Comments Pharyngitis, unspecified etiology    -  Primary ICD-10-CM: J02.9 ICD-9-CM: 162 Vitals BP Pulse Temp Resp Height(growth percentile) Weight(growth percentile) (!) 122/94 70 97.6 °F (36.4 °C) (Oral) 12 6' 2\" (1.88 m) 249 lb (112.9 kg) SpO2 BMI Smoking Status 97% 31.97 kg/m2 Current Every Day Smoker Vitals History BMI and BSA Data Body Mass Index Body Surface Area  
 31.97 kg/m 2 2.43 m 2 Preferred Pharmacy Pharmacy Name Phone [address], [address] [place] [address] [phone] Your Updated Medication List  
  
   
This list is accurate as of: 1/12/18  4:25 PM.  Always use your most recent med list.  
  
  
  
  
 hydroCHLOROthiazide 25 mg tablet Commonly known as:  HYDRODIURIL Take 1 Tab by mouth daily. indomethacin 50 mg capsule Commonly known as:  INDOCIN  
TAKE ONE CAPSULE BY MOUTH THREE TIMES DAILY AS NEEDED FOR  GOUT  
  
 lisinopril 20 mg tablet Commonly known as:  PRINIVIL, ZESTRIL  
TAKE ONE TABLET BY MOUTH ONCE DAILY [facility] HE[facility] SERVICES! [facility] introduces [employer] patient portal. Now you can access parts of your medical record, email your doctor's office, and request medication refills online. 1. In your internet browser, go to [email or web address] 2. Click on the First Time User? Click Here link in the Sign In box. You will see the New Member Sign Up page. 3. Enter your [employer] Access Code exactly as it appears below. You will not need to use this code after youve completed the sign-up process. If you do not sign up before the expiration date, you must request a new code. · [employer] Access Code: [email or web address] Expires: 4/12/2018  2:42 PM 
 
4. Enter the last four digits of your Social Security Number (xxxx) and Date of Birth (mm/dd/yyyy) as indicated and click Submit. You will be taken to the next sign-up page. 5. Create a [employer] ID. This will be your [employer] login ID and cannot be changed, so think of one that is secure and easy to remember. 6. Create a [employer] password. You can change your password at any time. 7. Enter your Password Reset Question and Answer. This can be used at a later time if you forget your password. 8. Enter your e-mail address. You will receive e-mail notification when new information is available in [address] 9. Click Sign Up. You can now view and download portions of your medical record. 10. Click the Download Summary menu link to download a portable copy of your medical information. If you have questions, please visit the Frequently Asked Questions section of the [employer] website. Remember, [employer] is NOT to be used for urgent needs. For medical emergencies, dial 911. Now available from your iPhone and Android! Please provide this summary of care documentation to your next provider. Your primary care clinician is listed as Laurice Plumb. Christopher Riedel. If you have any questions after today's visit, please call [phone].

## 2018-01-12 NOTE — PROGRESS NOTES
Jessie Herrera III,born 1965, is a 46 y.o. male, who is seen today for respiratory symptoms. 9 days ago he developed nasal congestion and he used Claritin-D for about 3 days and got better so he stopped using medication and within 2 days started having more nasal congestion more and more coughing which is minimally productive and a sore throat. Throat is keeping him awake at night because of its soreness. He coughs intermittently. He has not had any chills and does not think he has a fever. For the last day he has had some pressure in both ears. He normally smokes a pack per day but quit smoking 9 days ago. He has not been drinking any beer either. Yesterday he noted some discomfort in his left wrist and did use indomethacin which relieved the discomfort but that has come back somewhat this afternoon. He has a history of gout. Past Medical History:   Diagnosis Date    Hypertension      Current Outpatient Prescriptions   Medication Sig Dispense Refill    hydroCHLOROthiazide (HYDRODIURIL) 25 mg tablet Take 1 Tab by mouth daily. 90 Tab 3    indomethacin (INDOCIN) 50 mg capsule TAKE ONE CAPSULE BY MOUTH THREE TIMES DAILY AS NEEDED FOR  GOUT 90 Cap 1    lisinopril (PRINIVIL, ZESTRIL) 20 mg tablet TAKE ONE TABLET BY MOUTH ONCE DAILY 90 Tab 3     Visit Vitals    BP (!) 122/94    Pulse 70    Temp 97.6 °F (36.4 °C) (Oral)    Resp 12    Ht 6' 2\" (1.88 m)    Wt 249 lb (112.9 kg)    SpO2 97%    BMI 31.97 kg/m2     Nasal passages are clear. Ear canals and tympanic membranes appear normal with good light reflex bilaterally. No tenderness of the ears. Neck reveals no adenopathy. Sinuses are nontender. Lungs are clear to percussion. Good breath sounds with no wheezing or crackles. Heart reveals a regular rhythm with normal S1 and S2 no murmur gallop click or rub. Abdomen is somewhat obese soft nontender with no obvious hepatosplenomegaly or masses and no bruits.   Extremities reveal no clubbing cyanosis or edema. There is slight discomfort with range of motion of the left wrist with only minimal dorsal tenderness and no redness and no apparent effusion. Assessment: #1.  Upper respiratory infection with a lot of pain in his pharynx, out of proportion to any findings. Will treat with azithromycin and also have given him some hydrocodone to use at night over the next few nights. #2.  Cigarette smoker, have encouraged him to stop altogether since he has not smoked for 9 days. #3.  Slightly elevated blood pressure, he will follow-up for an office visit within the next few weeks for that. #4.  History of gout with some wrist pain but unclear if that is a recurrence of gout. He will use indomethacin as needed. He will arrange follow-up visit in the fairly near future. Petros Khan MD FACP    Please note: This document has been produced using voice recognition software. Unrecognized errors in transcription may be present.

## 2018-05-23 RX ORDER — INDOMETHACIN 50 MG/1
CAPSULE ORAL
Qty: 90 CAP | Refills: 1 | Status: SHIPPED | OUTPATIENT
Start: 2018-05-23 | End: 2018-09-11 | Stop reason: SDUPTHER

## 2018-06-01 ENCOUNTER — OFFICE VISIT (OUTPATIENT)
Dept: INTERNAL MEDICINE CLINIC | Age: 53
End: 2018-06-01

## 2018-06-01 VITALS
DIASTOLIC BLOOD PRESSURE: 80 MMHG | TEMPERATURE: 100 F | OXYGEN SATURATION: 97 % | HEART RATE: 71 BPM | WEIGHT: 246 LBS | SYSTOLIC BLOOD PRESSURE: 144 MMHG | HEIGHT: 74 IN | RESPIRATION RATE: 14 BRPM | BODY MASS INDEX: 31.57 KG/M2

## 2018-06-01 DIAGNOSIS — Z87.39 HISTORY OF GOUT: ICD-10-CM

## 2018-06-01 DIAGNOSIS — I10 ESSENTIAL HYPERTENSION WITH GOAL BLOOD PRESSURE LESS THAN 140/90: Primary | ICD-10-CM

## 2018-06-01 DIAGNOSIS — E78.5 HYPERLIPIDEMIA LDL GOAL <130: ICD-10-CM

## 2018-06-01 RX ORDER — AMLODIPINE BESYLATE 10 MG/1
10 TABLET ORAL DAILY
Qty: 30 TAB | Refills: 2 | Status: SHIPPED | OUTPATIENT
Start: 2018-06-01 | End: 2018-09-04 | Stop reason: SDUPTHER

## 2018-06-01 RX ORDER — HYDROCHLOROTHIAZIDE 25 MG/1
12.5 TABLET ORAL DAILY
Qty: 45 TAB | Refills: 0
Start: 2018-06-01 | End: 2019-05-03 | Stop reason: ALTCHOICE

## 2018-06-01 NOTE — PROGRESS NOTES
1. Have you been to the ER, urgent care clinic or hospitalized since your last visit? NO.     2. Have you seen or consulted any other health care providers outside of the [address] [place] [address] since your last visit (Include any pap smears or colon screening)? NO      Do you have an Advanced Directive? NO    Would you like information on Advanced Directives? NO      Chief Complaint   Patient presents with    Hypertension     follow up       room 10    Hand Pain     left hand pain.  pt stated pain is off and on. took 2 indocin and it helps

## 2018-06-01 NOTE — MR AVS SNAPSHOT
[address] [address] [address] 
[phone] Patient: Ophelia Clifton 
MRN: [id number] :1965 Visit Information Date & Time Provider Department Dept. Phone Encounter #  
 2018  4:00 PM Hansel Wyatt MD [facility] of [facility] [phone] Follow-up Instructions Return in about 3 weeks (around 2018). Upcoming Health Maintenance Date Due Hepatitis C Screening 1965 Pneumococcal 19-64 Medium Risk (1 of 1 - PPSV23) 1984 DTaP/Tdap/Td series (1 - Tdap) 1986 FOBT Q 1 YEAR AGE 50-75 2015 Influenza Age 5 to Adult 2018 Allergies as of 2018  Review Complete On: 2018 By: Hansel Wyatt MD  
  
 Severity Noted Reaction Type Reactions Amoxicillin High 2015    Anaphylaxis Pcn [Penicillins]    Anaphylaxis As a child Current Immunizations  Never Reviewed No immunizations on file. Not reviewed this visit You Were Diagnosed With   
  
 Codes Comments Essential hypertension with goal blood pressure less than 140/90    -  Primary ICD-10-CM: I10 
ICD-9-CM: 401.9 History of gout     ICD-10-CM: Z87.39 
ICD-9-CM: V12.29 Hyperlipidemia LDL goal <130     ICD-10-CM: E78.5 ICD-9-CM: 272.4 Vitals BP Pulse Temp Resp Height(growth percentile) Weight(growth percentile) (!) 156/106 (BP 1 Location: Right arm, BP Patient Position: Sitting) 71 100 °F (37.8 °C) (Oral) 14 6' 2\" (1.88 m) 246 lb (111.6 kg) SpO2 BMI Smoking Status 97% 31.58 kg/m2 Current Every Day Smoker Vitals History BMI and BSA Data Body Mass Index Body Surface Area 31.58 kg/m 2 2.41 m 2 Preferred Pharmacy Pharmacy Name Phone [facility] [address] [facility] [phone] Your Updated Medication List  
  
   
 This list is accurate as of 6/1/18  4:35 PM.  Always use your most recent med list. amLODIPine 10 mg tablet Commonly known as:  Remonia Grates Take 1 Tab by mouth daily. hydroCHLOROthiazide 25 mg tablet Commonly known as:  HYDRODIURIL Take 1 Tab by mouth daily. HYDROcodone-acetaminophen 7.5-325 mg per tablet Commonly known as:  Elissa Harden Take 1 Tab by mouth every six (6) hours as needed for Pain. Max Daily Amount: 4 Tabs. indomethacin 50 mg capsule Commonly known as:  INDOCIN  
TAKE ONE CAPSULE BY MOUTH THREE TIMES DAILY AS NEEDED FOR  GOUT  
  
 lisinopril 20 mg tablet Commonly known as:  PRINIVIL, ZESTRIL  
TAKE ONE TABLET BY MOUTH ONCE DAILY Prescriptions Printed Refills  
 amLODIPine (NORVASC) 10 mg tablet 2 Sig: Take 1 Tab by mouth daily. Class: Print Route: Oral  
  
Follow-up Instructions Return in about 3 weeks (around 6/22/2018). Introducing [facility] & HE[facility]! Nel Velazco introduces [employer] patient portal. Now you can access parts of your medical record, email your doctor's office, and request medication refills online. 1. In your internet browser, go to [email or web address] 2. Click on the First Time User? Click Here link in the Sign In box. You will see the New Member Sign Up page. 3. Enter your [employer] Access Code exactly as it appears below. You will not need to use this code after youve completed the sign-up process. If you do not sign up before the expiration date, you must request a new code. · [employer] Access Code: [email or web address] Expires: 8/30/2018  3:32 PM 
 
4. Enter the last four digits of your Social Security Number (xxxx) and Date of Birth (mm/dd/yyyy) as indicated and click Submit. You will be taken to the next sign-up page. 5. Create a [employer] ID. This will be your [employer] login ID and cannot be changed, so think of one that is secure and easy to remember. 6. Create a [employer] password. You can change your password at any time. 7. Enter your Password Reset Question and Answer. This can be used at a later time if you forget your password. 8. Enter your e-mail address. You will receive e-mail notification when new information is available in [address] 9. Click Sign Up. You can now view and download portions of your medical record. 10. Click the Download Summary menu link to download a portable copy of your medical information. If you have questions, please visit the Frequently Asked Questions section of the [employer] website. Remember, [employer] is NOT to be used for urgent needs. For medical emergencies, dial 911. Now available from your iPhone and Android! Please provide this summary of care documentation to your next provider. Your primary care clinician is listed as Mckinley Hanley. Michael Mallory. If you have any questions after today's visit, please call [phone].

## 2018-06-01 NOTE — PROGRESS NOTES
Yordy Leal III,born 1965, is a 46 y.o. male, who is seen today for reevaluation of hypertension, gout, obesity and excess alcohol. He still enjoys beer after work especially on hot stressful days. He is drinking more again like he did last year. He has had pain in his left wrist again starting a couple of days ago but already somewhat better with indomethacin. He has had several episodes of gout over the last year. He did cut down on his hydrochlorothiazide to 12.5 mg daily and he takes lisinopril regularly for hypertension. 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 He still smokes but has cut back considerably on his smoking. Past Medical History:   Diagnosis Date    Hypertension      Current Outpatient Prescriptions   Medication Sig Dispense Refill    amLODIPine (NORVASC) 10 mg tablet Take 1 Tab by mouth daily. 30 Tab 2    hydroCHLOROthiazide (HYDRODIURIL) 25 mg tablet Take 0.5 Tabs by mouth daily. 45 Tab 0    indomethacin (INDOCIN) 50 mg capsule TAKE ONE CAPSULE BY MOUTH THREE TIMES DAILY AS NEEDED FOR  GOUT 90 Cap 1    lisinopril (PRINIVIL, ZESTRIL) 20 mg tablet TAKE ONE TABLET BY MOUTH ONCE DAILY 90 Tab 3   (He will be starting amlodipine tomorrow, he has not vomited yet.)      Visit Vitals    /80 (BP 1 Location: Right arm, BP Patient Position: Sitting)    Pulse 71    Temp 100 °F (37.8 °C) (Oral)    Resp 14    Ht 6' 2\" (1.88 m)    Wt 246 lb (111.6 kg)    SpO2 97%    BMI 31.58 kg/m2     Carotids are 2+ without bruits. Lungs are clear to percussion. Good breath sounds with no wheezing or crackles. Heart reveals a regular rhythm with normal S1 and S2 no murmur gallop click or rub. Apical impulse is not palpable. Abdomen is soft and nontender with no hepatosplenomegaly or masses and no bruits. Extremities reveal no clubbing cyanosis or edema.   There is some tenderness in the dorsal left wrist with slight discomfort with range of motion. No redness or effusion noted. Pulses are 2+. Assessment: #1. Hypertension slightly suboptimally controlled, advised him to cut back on alcohol considerably particularly in view of his upcoming DOT physical but because he should be doing that anyway and not drinking more than 2 alcoholic beverages per day on average. He will continue lisinopril 20 mg daily and hydrochlorothiazide 12.5 mg daily and he will start amlodipine 10 mg daily. I did tell him that a possible side effect is edema of the ankles and lower legs and feet that does develop any significant we will have to cut the dose or discontinue the medicine. #3.  Recurrent gout, he will use indomethacin as needed and the plan will be to adjust his blood pressure pills and eventually discontinue hydrochlorothiazide, probably after his upcoming DOT physical, but possibly sooner. Uric acid level last year was 10.5. #4.  Cigarette smoker, he has cut back and hopefully he can eventually stop smoking altogether. He understands that this is important as a long-term goal, and the sooner he achieved a goal of better. He understands the dangers of smoking. Follow-up 3 weeks and her blood pressure is very good we might stop hydrochlorothiazide, versus continuing it until after his July 1 DOT physical.    Kun Calderon. Sarah López MD FACP    Please note: This document has been produced using voice recognition software. Unrecognized errors in transcription may be present.

## 2018-07-06 ENCOUNTER — OFFICE VISIT (OUTPATIENT)
Dept: INTERNAL MEDICINE CLINIC | Age: 53
End: 2018-07-06

## 2018-07-06 VITALS
OXYGEN SATURATION: 98 % | SYSTOLIC BLOOD PRESSURE: 118 MMHG | HEART RATE: 70 BPM | DIASTOLIC BLOOD PRESSURE: 80 MMHG | BODY MASS INDEX: 31.57 KG/M2 | TEMPERATURE: 98.2 F | WEIGHT: 246 LBS | RESPIRATION RATE: 16 BRPM | HEIGHT: 74 IN

## 2018-07-06 DIAGNOSIS — I10 ESSENTIAL HYPERTENSION WITH GOAL BLOOD PRESSURE LESS THAN 140/90: Primary | ICD-10-CM

## 2018-07-06 DIAGNOSIS — M54.12 CERVICAL RADICULOPATHY: ICD-10-CM

## 2018-07-06 DIAGNOSIS — Z87.39 HISTORY OF GOUT: ICD-10-CM

## 2018-07-06 RX ORDER — DEXAMETHASONE 4 MG/1
TABLET ORAL
Qty: 14 TAB | Refills: 0 | Status: SHIPPED | OUTPATIENT
Start: 2018-07-06 | End: 2019-05-03 | Stop reason: ALTCHOICE

## 2018-07-06 NOTE — PROGRESS NOTES
Tricia Estrella III,born 1965, is a 46 y.o. male, who is seen today for reevaluation of hypertension, gout, excess alcohol intake and now with neck pain. For the last 6 days he has had neck pain in the left posterior lateral neck and occasionally some tingling in the fingertips of both hands particularly at night. He did not go to work all this week because of the neck pain. He has cut way back on alcohol consumption and is taking all of his blood pressure medicine including the newest amlodipine was added 5 weeks ago. He has a DOT physical coming up soon and wants to do everything he can to pass the test.    Past Medical History:   Diagnosis Date    Hypertension      Current Outpatient Prescriptions   Medication Sig Dispense Refill    dexamethasone (DECADRON) 4 mg tablet 1 tablet by mouth twice a day 14 Tab 0    amLODIPine (NORVASC) 10 mg tablet Take 1 Tab by mouth daily. 30 Tab 2    hydroCHLOROthiazide (HYDRODIURIL) 25 mg tablet Take 0.5 Tabs by mouth daily. 45 Tab 0    indomethacin (INDOCIN) 50 mg capsule TAKE ONE CAPSULE BY MOUTH THREE TIMES DAILY AS NEEDED FOR  GOUT 90 Cap 1    lisinopril (PRINIVIL, ZESTRIL) 20 mg tablet TAKE ONE TABLET BY MOUTH ONCE DAILY 90 Tab 3     Visit Vitals    /80 (BP 1 Location: Left arm, BP Patient Position: Sitting)    Pulse 70    Temp 98.2 °F (36.8 °C) (Oral)    Resp 16    Ht 6' 2\" (1.88 m)    Wt 246 lb (111.6 kg)    SpO2 98%    BMI 31.58 kg/m2     Carotids are 2+ without bruits. Stents of the neck and downward pressure produces pain only in the posterior lateral left neck and not into the intrascapular region shoulder or arm. Radial pulses are 2+. Lungs are clear to percussion. Good breath sounds with no wheezing or crackles. Heart reveals a regular rhythm with normal S1-S2 no murmur gallop click or rub. Extremities reveal no clubbing cyanosis or edema. Assessment: #1.   Hypertension much better controlled with far less alcohol intake and now on triple therapy. For now he will continue amlodipine 10 mg daily, hydrochlorothiazide 12.5 mg daily and lisinopril 20 mg daily. #2.  Long history of excessive alcohol intake, he has cut way back and maintain very little alcohol intake hopefully indefinitely, but he needs to do it at least through the time of his DOT physical so his blood pressure does not spike. #3. Neck pain for 6 days, he may have mild cervical radiculopathy but there is no radiation of the pain. He will be treated with dexamethasone 4 mg twice a day for 7 days and hopefully will get better a lot faster that way. He may continue his topical cream.  #4.  History of gout without recent recurrence. With less alcohol that is likely to stay better and he is on a lower dose of diuretic than he used to be. If he keeps getting gout we will have to stop his diuretic or start allopurinol. He would like to come back in 2 weeks for 1 more checkup so we will do that for him. Petros Moon MD FACP    Please note: This document has been produced using voice recognition software. Unrecognized errors in transcription may be present.

## 2018-07-06 NOTE — MR AVS SNAPSHOT
[address] 
[phone] Patient: Tessy Amato 
MRN: [id number] :1965 Visit Information Date & Time Provider Department Dept. Phone Encounter #  
 2018  4:00 PM Amalia Guzman MD [facility] [phone] Follow-up Instructions Return in about 2 weeks (around 2018). Upcoming Health Maintenance Date Due Hepatitis C Screening 1965 Pneumococcal 19-64 Medium Risk (1 of 1 - PPSV23) 1984 DTaP/Tdap/Td series (1 - Tdap) 1986 FOBT Q 1 YEAR AGE 50-75 2015 Influenza Age 5 to Adult 2018 Allergies as of 2018  Review Complete On: 2018 By: Amalia Guzman MD  
  
 Severity Noted Reaction Type Reactions Amoxicillin High 2015    Anaphylaxis Pcn [Penicillins]    Anaphylaxis As a child Current Immunizations  Never Reviewed No immunizations on file. Not reviewed this visit You Were Diagnosed With   
  
 Codes Comments Essential hypertension with goal blood pressure less than 140/90    -  Primary ICD-10-CM: I10 
ICD-9-CM: 401.9 Cervical radiculopathy     ICD-10-CM: M54.12 
ICD-9-CM: 723.4 History of gout     ICD-10-CM: Z87.39 
ICD-9-CM: V12.29 Vitals BP Pulse Temp Resp Height(growth percentile) Weight(growth percentile) 118/80 (BP 1 Location: Left arm, BP Patient Position: Sitting) 70 98.2 °F (36.8 °C) (Oral) 16 6' 2\" (1.88 m) 246 lb (111.6 kg) SpO2 BMI Smoking Status 98% 31.58 kg/m2 Current Every Day Smoker Vitals History BMI and BSA Data Body Mass Index Body Surface Area 31.58 kg/m 2 2.41 m 2 Preferred Pharmacy Pharmacy Name Phone [phone] [facility] [phone] Your Updated Medication List  
  
   
 This list is accurate as of 18  4:24 PM.  Always use your most recent med list. amLODIPine 10 mg tablet Commonly known as:  Rondall Maximo Take 1 Tab by mouth daily. dexamethasone 4 mg tablet Commonly known as:  DECADRON  
1 tablet by mouth twice a day  
  
 hydroCHLOROthiazide 25 mg tablet Commonly known as:  HYDRODIURIL Take 0.5 Tabs by mouth daily. indomethacin 50 mg capsule Commonly known as:  INDOCIN  
TAKE ONE CAPSULE BY MOUTH THREE TIMES DAILY AS NEEDED FOR  GOUT  
  
 lisinopril 20 mg tablet Commonly known as:  PRINIVIL, ZESTRIL  
TAKE ONE TABLET BY MOUTH ONCE DAILY Prescriptions Printed Refills  
 dexamethasone (DECADRON) 4 mg tablet 0 Si tablet by mouth twice a day Class: Print Follow-up Instructions Return in about 2 weeks (around 2018). Introducing [facility]! [employer] introduces [facility] patient portal. Now you can access parts of your medical record, email your doctor's office, and request medication refills online. 1. In your internet browser, go to [email or web address] 2. Click on the First Time User? Click Here link in the Sign In box. You will see the New Member Sign Up page. 3. Enter your [facility] Access Code exactly as it appears below. You will not need to use this code after youve completed the sign-up process. If you do not sign up before the expiration date, you must request a new code. · [facility] Access Code: [email or web address] Expires: 2018  3:32 PM 
 
4. Enter the last four digits of your Social Security Number (xxxx) and Date of Birth (mm/dd/yyyy) as indicated and click Submit. You will be taken to the next sign-up page. 5. Create a [employer] ID. This will be your [facility] login ID and cannot be changed, so think of one that is secure and easy to remember. 6. Create a [employer] password. You can change your password at any time. 7. Enter your Password Reset Question and Answer. This can be used at a later time if you forget your password. 8. Enter your e-mail address. You will receive e-mail notification when new information is available in [address] 9. Click Sign Up. You can now view and download portions of your medical record. 10. Click the Download Summary menu link to download a portable copy of your medical information. If you have questions, please visit the Frequently Asked Questions section of the [employer] website. Remember, [employer] is NOT to be used for urgent needs. For medical emergencies, dial 911. Now available from your iPhone and Android! Please provide this summary of care documentation to your next provider. Your primary care clinician is listed as Yari Srinivasan. Brien Brown. If you have any questions after today's visit, please call [phone].

## 2018-07-20 ENCOUNTER — OFFICE VISIT (OUTPATIENT)
Dept: INTERNAL MEDICINE CLINIC | Age: 53
End: 2018-07-20

## 2018-07-20 VITALS
RESPIRATION RATE: 14 BRPM | OXYGEN SATURATION: 98 % | HEIGHT: 74 IN | DIASTOLIC BLOOD PRESSURE: 80 MMHG | TEMPERATURE: 98 F | BODY MASS INDEX: 30.98 KG/M2 | WEIGHT: 241.4 LBS | SYSTOLIC BLOOD PRESSURE: 112 MMHG | HEART RATE: 70 BPM

## 2018-07-20 DIAGNOSIS — Z87.39 HISTORY OF GOUT: ICD-10-CM

## 2018-07-20 DIAGNOSIS — I10 ESSENTIAL HYPERTENSION WITH GOAL BLOOD PRESSURE LESS THAN 140/90: Primary | ICD-10-CM

## 2018-07-20 DIAGNOSIS — Z00.00 ROUTINE GENERAL MEDICAL EXAMINATION AT A HEALTH CARE FACILITY: ICD-10-CM

## 2018-07-20 DIAGNOSIS — E78.5 HYPERLIPIDEMIA LDL GOAL <130: ICD-10-CM

## 2018-07-20 DIAGNOSIS — E66.9 OBESITY (BMI 30-39.9): ICD-10-CM

## 2018-07-20 DIAGNOSIS — Z12.5 PROSTATE CANCER SCREENING: ICD-10-CM

## 2018-07-20 NOTE — PROGRESS NOTES
1. Have you been to the ER, urgent care clinic or hospitalized since your last visit? NO.     2. Have you seen or consulted any other health care providers outside of the [address] [place] [address] since your last visit (Include any pap smears or colon screening)? NO      Do you have an Advanced Directive? yes    Would you like information on Advanced Directives? No

## 2018-07-20 NOTE — PROGRESS NOTES
Buddie Cogan III,born 1965, is a 46 y.o. male, who is seen today for reevaluation of hypertension obesity neck pain and alcoholism. He has cut back more on alcohol intake, he is only been drinking 2 nights of the last 3 weeks. He is taking all of his blood pressure medicine and tolerating those well. 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 He finds his blood pressure runs higher in the morning than it does in the afternoon, sometimes diastolic is around 47FJ thing in the morning. He still having pain in the posterior neck but it does not radiate into the shoulders or arms. He was treated with dexamethasone and he does not think that helped at all. Past Medical History:   Diagnosis Date    Hypertension      Current Outpatient Prescriptions   Medication Sig Dispense Refill    dexamethasone (DECADRON) 4 mg tablet 1 tablet by mouth twice a day 14 Tab 0    amLODIPine (NORVASC) 10 mg tablet Take 1 Tab by mouth daily. 30 Tab 2    hydroCHLOROthiazide (HYDRODIURIL) 25 mg tablet Take 0.5 Tabs by mouth daily. 45 Tab 0    indomethacin (INDOCIN) 50 mg capsule TAKE ONE CAPSULE BY MOUTH THREE TIMES DAILY AS NEEDED FOR  GOUT 90 Cap 1    lisinopril (PRINIVIL, ZESTRIL) 20 mg tablet TAKE ONE TABLET BY MOUTH ONCE DAILY 90 Tab 3     Visit Vitals    /80 (BP 1 Location: Right arm, BP Patient Position: Sitting)    Pulse 70    Temp 98 °F (36.7 °C) (Oral)    Resp 14    Ht 6' 2\" (1.88 m)    Wt 241 lb 6.4 oz (109.5 kg)    SpO2 98%    BMI 30.99 kg/m2     There is mild tenderness in the posterior lateral neck but not along the cervical spine itself. He has quite good range of motion with some discomfort with range of motion of the neck especially with flexion. Carotids are 2+ without bruits. Lungs are clear to percussion. Good breath sounds with no wheezing or crackles. Heart reveals a regular rhythm with normal S1-S2 no murmur gallop click or rub. Apical impulse is not palpable.   Abdomen is soft and nontender with no hepatosplenomegaly or masses and no bruits. Extremities reveal no clubbing cyanosis or edema. Pulses are 2+ throughout. Assessment: #1. Hypertension blood pressure nicely controlled. For some reason seems to be higher in the mornings. For now we will continue amlodipine 10 mg daily, hydrochlorothiazide 12.5 mg daily and lisinopril 20 mg daily but after his DOT evaluation he may stop hydrochlorothiazide and we will recheck his blood pressure in a few months when he has his physical.  He tells me that the lady of checks his blood pressure for the [facility] physical checks it after sitting just a few seconds. I told him it should be after sitting at least 5 minutes feet on the floor and not talking or eating etc.  I told him that if it is checked sooner than that he should remind the examiner that measurements of pressure under nonstandard conditions are invalid. #2.  Alcoholism doing much better, have encouraged him not to drink alcohol especially for the week or 2 before his DOT physical.  #3.  Mild obesity, encouraged him to work on further weight loss, drinking lots of water and not alcohol, he is already starting to do that. #4.  Continuing neck pain, have recommended he see his orthopedist, Dr. Bruce Montoya, he might benefit from physical therapy. Follow-up in about 4 months for a physical    Petros Sheikh MD FACP    Please note: This document has been produced using voice recognition software. Unrecognized errors in transcription may be present.

## 2018-08-06 RX ORDER — LISINOPRIL 20 MG/1
TABLET ORAL
Qty: 90 TAB | Refills: 3 | Status: SHIPPED | OUTPATIENT
Start: 2018-08-06 | End: 2019-09-04 | Stop reason: SDUPTHER

## 2018-09-09 RX ORDER — AMLODIPINE BESYLATE 10 MG/1
TABLET ORAL
Qty: 30 TAB | Refills: 2 | Status: SHIPPED | OUTPATIENT
Start: 2018-09-09 | End: 2018-09-11 | Stop reason: SDUPTHER

## 2018-09-11 RX ORDER — INDOMETHACIN 50 MG/1
50 CAPSULE ORAL
Qty: 270 CAP | Refills: 0 | Status: SHIPPED | OUTPATIENT
Start: 2018-09-11 | End: 2019-06-07 | Stop reason: SDUPTHER

## 2018-09-11 RX ORDER — AMLODIPINE BESYLATE 10 MG/1
10 TABLET ORAL DAILY
Qty: 90 TAB | Refills: 0 | Status: SHIPPED | OUTPATIENT
Start: 2018-09-11 | End: 2019-04-12 | Stop reason: SDUPTHER

## 2018-09-11 NOTE — TELEPHONE ENCOUNTER
Insurance requires a minimum fill for 90 days. If appropriate, please sign pended medication order. If not, please notify me. Requested Prescriptions     Pending Prescriptions Disp Refills    amLODIPine (NORVASC) 10 mg tablet 90 Tab 0     Sig: Take 1 Tab by mouth daily.      Signed Prescriptions Disp Refills    amLODIPine (NORVASC) 10 mg tablet 30 Tab 2     Sig: TAKE 1 TABLET BY MOUTH ONCE DAILY     Authorizing Provider: Charlee Camarillo

## 2018-09-11 NOTE — TELEPHONE ENCOUNTER
Last Visit: 07/20/2018 with MD Shelley Rojas    Next Appointment: 01/18/2019 with MD Shelley Rojas   Previous Refill Encounters: 05/23/2018 per MD Shelley Rojas #90 with 1 refill     Requested Prescriptions     Pending Prescriptions Disp Refills    indomethacin (INDOCIN) 50 mg capsule 270 Cap 0     Sig: Take 1 Cap by mouth three (3) times daily as needed.

## 2019-04-12 RX ORDER — AMLODIPINE BESYLATE 10 MG/1
TABLET ORAL
Qty: 90 TAB | Refills: 0 | Status: SHIPPED | OUTPATIENT
Start: 2019-04-12 | End: 2019-07-08 | Stop reason: SDUPTHER

## 2019-05-03 ENCOUNTER — OFFICE VISIT (OUTPATIENT)
Dept: INTERNAL MEDICINE CLINIC | Age: 54
End: 2019-05-03

## 2019-05-03 VITALS
RESPIRATION RATE: 12 BRPM | HEART RATE: 68 BPM | SYSTOLIC BLOOD PRESSURE: 136 MMHG | TEMPERATURE: 98 F | DIASTOLIC BLOOD PRESSURE: 88 MMHG | OXYGEN SATURATION: 98 % | BODY MASS INDEX: 32.24 KG/M2 | HEIGHT: 74 IN | WEIGHT: 251.2 LBS

## 2019-05-03 DIAGNOSIS — S39.012A BACK STRAIN, INITIAL ENCOUNTER: ICD-10-CM

## 2019-05-03 DIAGNOSIS — E66.9 OBESITY (BMI 30-39.9): ICD-10-CM

## 2019-05-03 DIAGNOSIS — Z87.39 HISTORY OF GOUT: ICD-10-CM

## 2019-05-03 DIAGNOSIS — I10 ESSENTIAL HYPERTENSION WITH GOAL BLOOD PRESSURE LESS THAN 140/90: Primary | ICD-10-CM

## 2019-05-03 RX ORDER — CYCLOBENZAPRINE HCL 10 MG
10 TABLET ORAL
Qty: 30 TAB | Refills: 1 | Status: SHIPPED | OUTPATIENT
Start: 2019-05-03 | End: 2020-05-09

## 2019-05-03 NOTE — PROGRESS NOTES
Nixon Candelaria III,born 1965, is a 48 y.o. male, who is seen today for reevaluation of hypertension obesity gout returns for reevaluation but also concerned about recent back injury. He fell a few weeks ago and now for the last week is gotten even worse he feels that the right side of his back in the midthoracic region. He has had to take off 4 days of work this week to rest it. He also has a DOT physical coming up in July would like to come in again to be sure his blood pressure is doing okay in June. His blood pressures been running high at home lately. He takes his medicine regularly. He occasionally gets gout and uses indomethacin when he needs to. Past Medical History:   Diagnosis Date    Hypertension      Current Outpatient Medications   Medication Sig Dispense Refill    amLODIPine (NORVASC) 10 mg tablet TAKE 1 TABLET BY MOUTH ONCE DAILY 90 Tab 0    indomethacin (INDOCIN) 50 mg capsule Take 1 Cap by mouth three (3) times daily as needed. 270 Cap 0    lisinopril (PRINIVIL, ZESTRIL) 20 mg tablet TAKE ONE TABLET BY MOUTH ONCE DAILY 90 Tab 3     Visit Vitals  /88 (BP 1 Location: Left arm, BP Patient Position: Sitting)   Pulse 68   Temp 98 °F (36.7 °C) (Oral)   Resp 12   Ht 6' 2\" (1.88 m)   Wt 251 lb 3.2 oz (113.9 kg)   SpO2 98%   BMI 32.25 kg/m²     Carotids are 2+ without bruits. Lungs are clear to percussion. Good breath sounds with no wheezing or crackles. There is no tenderness along the spine or in the thoracic part of his chest posteriorly. Heart reveals a regular rhythm with normal S1-S2 no murmur gallop click or rub. Apical impulse is not palpable. Extremities reveal no clubbing cyanosis or edema. Pulses are 2+. Assessment: #1. Hypertension controlled but barely and higher at home. For now he will continue amlodipine 10 mg daily and lisinopril 20 mg daily.   I will see him back in June and the blood pressure is too high to pass the DOT physical we may start him on hydrochlorothiazide for a short time to lower blood pressure or switch to nifedipine. He does not have insurance. #2.  Back strain, this should clear on its own recommended rest heat and ibuprofen as needed and will call in a prescription for Flexeril since his bottle at home is fairly old. #3.  Obesity, he is gained a little weight, is good to work on weight loss in the coming months. #4.  History of gout with occasional recurrence, he will use indomethacin when needed. He will schedule an appointment for Lara. Petros Erickson MD FACP    Please note: This document has been produced using voice recognition software. Unrecognized errors in transcription may be present.

## 2019-06-06 NOTE — PROGRESS NOTES
Collette Schuller presents today for   Chief Complaint   Patient presents with    Hypertension     1 month follow up             room 9    Medication Refill              Depression Screening:  3 most recent PHQ Screens 6/7/2019   Little interest or pleasure in doing things Not at all   Feeling down, depressed, irritable, or hopeless Not at all   Total Score PHQ 2 0       Learning Assessment:  Learning Assessment 5/7/2014   PRIMARY LEARNER Patient   HIGHEST LEVEL OF EDUCATION - PRIMARY LEARNER  GRADUATED HIGH SCHOOL OR GED   BARRIERS PRIMARY LEARNER NONE   CO-LEARNER CAREGIVER No   PRIMARY LANGUAGE ENGLISH   LEARNER PREFERENCE PRIMARY DEMONSTRATION   ANSWERED BY patient   RELATIONSHIP SELF       Abuse Screening:  Abuse Screening Questionnaire 6/7/2019   Do you ever feel afraid of your partner? N   Are you in a relationship with someone who physically or mentally threatens you? N   Is it safe for you to go home? -       Fall Risk  No flowsheet data found. Coordination of Care:  1. Have you been to the ER, urgent care clinic since your last visit? Hospitalized since your last visit? no    2. Have you seen or consulted any other health care providers outside of the [address] [place] [address] since your last visit? Include any pap smears or colon screening.  no

## 2019-06-07 ENCOUNTER — OFFICE VISIT (OUTPATIENT)
Dept: INTERNAL MEDICINE CLINIC | Age: 54
End: 2019-06-07

## 2019-06-07 VITALS
TEMPERATURE: 97.8 F | HEIGHT: 74 IN | OXYGEN SATURATION: 98 % | WEIGHT: 250 LBS | RESPIRATION RATE: 14 BRPM | SYSTOLIC BLOOD PRESSURE: 132 MMHG | HEART RATE: 74 BPM | BODY MASS INDEX: 32.08 KG/M2 | DIASTOLIC BLOOD PRESSURE: 70 MMHG

## 2019-06-07 DIAGNOSIS — I10 ESSENTIAL HYPERTENSION WITH GOAL BLOOD PRESSURE LESS THAN 140/90: Primary | ICD-10-CM

## 2019-06-07 DIAGNOSIS — Z87.39 HISTORY OF GOUT: ICD-10-CM

## 2019-06-07 RX ORDER — INDOMETHACIN 50 MG/1
CAPSULE ORAL
Qty: 90 CAP | Refills: 2 | Status: SHIPPED | OUTPATIENT
Start: 2019-06-07 | End: 2020-05-09

## 2019-06-07 RX ORDER — HYDROCHLOROTHIAZIDE 25 MG/1
25 TABLET ORAL DAILY
Qty: 10 TAB | Refills: 0 | Status: SHIPPED | OUTPATIENT
Start: 2019-06-07 | End: 2020-06-05 | Stop reason: ALTCHOICE

## 2019-06-07 NOTE — PROGRESS NOTES
Lisa Snyder III,born 1965, is a 48 y.o. male, who is seen today for reevaluation of hypertension history of gout obesity. At home his blood pressure still registers high sometimes and other times normal, he change the batteries on his machine he is following his diet he still smokes but is cut back he takes his blood pressure pills regularly. He does get occasional episodes of gout, none very recently. Past Medical History:   Diagnosis Date    Hypertension      Current Outpatient Medications   Medication Sig Dispense Refill    cyclobenzaprine (FLEXERIL) 10 mg tablet Take 1 Tab by mouth three (3) times daily as needed for Muscle Spasm(s). 30 Tab 1    amLODIPine (NORVASC) 10 mg tablet TAKE 1 TABLET BY MOUTH ONCE DAILY 90 Tab 0    indomethacin (INDOCIN) 50 mg capsule Take 1 Cap by mouth three (3) times daily as needed. 270 Cap 0    lisinopril (PRINIVIL, ZESTRIL) 20 mg tablet TAKE ONE TABLET BY MOUTH ONCE DAILY 90 Tab 3     Visit Vitals  /70   Pulse 74   Temp 97.8 °F (36.6 °C) (Oral)   Resp 14   Ht 6' 2\" (1.88 m)   Wt 250 lb (113.4 kg)   SpO2 98%   BMI 32.10 kg/m²     Initial blood pressure with him talking was 142/76. Assessment: Hypertension probably controlled, but has a DOT physical coming up in early July right after vacation. He would like to come in and be checked just before that appointment to be sure he passes it. He will continue current medication and I will treat him with hydrochlorothiazide for just 1 week prior to the DOT physical and then he will stop it because I do not think he really needs it and it will cause gout. This visit lasted 15 minutes, greater than 50% of the time was spent counseling on control blood pressure and how I feel it is controlled but might not be when he goes in for DOT physical and has everything on the line, he says he needs to work for 5 more years.   I went over the above discussion with him as to the recommendation that he not smoke for at least 30 minutes before the DOT physical and that he was hydrochlorthiazide for a week before as well along with his other medicine. He will use indomethacin if needed for gout. Follow-up in about 1 month at his request.    Calderon Abdi. Ronel Lobo MD FACP    Please note: This document has been produced using voice recognition software. Unrecognized errors in transcription may be present.

## 2019-07-08 ENCOUNTER — OFFICE VISIT (OUTPATIENT)
Dept: INTERNAL MEDICINE CLINIC | Age: 54
End: 2019-07-08

## 2019-07-08 VITALS
RESPIRATION RATE: 14 BRPM | DIASTOLIC BLOOD PRESSURE: 88 MMHG | TEMPERATURE: 98.2 F | SYSTOLIC BLOOD PRESSURE: 130 MMHG | BODY MASS INDEX: 32.62 KG/M2 | OXYGEN SATURATION: 98 % | HEART RATE: 72 BPM | WEIGHT: 254.2 LBS | HEIGHT: 74 IN

## 2019-07-08 DIAGNOSIS — I10 ESSENTIAL HYPERTENSION WITH GOAL BLOOD PRESSURE LESS THAN 140/90: Primary | ICD-10-CM

## 2019-07-08 NOTE — PROGRESS NOTES
Earnest Real III,born 1965, is a 48 y.o. male, who is seen today for reevaluation of hypertension cigarette smoking overweight and use of alcohol. He is cut way back on alcohol over the last 5 weeks, drinking only occasionally and smoking only when he drinks, 4 packs over the last 5 weeks. When he is checked his blood pressure at home is getting in the 140s most of the time and between 26-19 diastolic most of the time. It is been lower here. He is using amlodipine and lisinopril. He is planning on going for his DOT physical July 22. He otherwise feels well. Past Medical History:   Diagnosis Date    Hypertension      Current Outpatient Medications   Medication Sig Dispense Refill    indomethacin (INDOCIN) 50 mg capsule 1 tablet by mouth 3 times a day as needed for gout 90 Cap 2    hydroCHLOROthiazide (HYDRODIURIL) 25 mg tablet Take 1 Tab by mouth daily. 10 Tab 0    cyclobenzaprine (FLEXERIL) 10 mg tablet Take 1 Tab by mouth three (3) times daily as needed for Muscle Spasm(s). 30 Tab 1    amLODIPine (NORVASC) 10 mg tablet TAKE 1 TABLET BY MOUTH ONCE DAILY 90 Tab 0    lisinopril (PRINIVIL, ZESTRIL) 20 mg tablet TAKE ONE TABLET BY MOUTH ONCE DAILY 90 Tab 3     Visit Vitals  /88 (BP 1 Location: Right arm, BP Patient Position: Sitting)   Pulse 72   Temp 98.2 °F (36.8 °C) (Oral)   Resp 14   Ht 6' 2\" (1.88 m)   Wt 254 lb 3.2 oz (115.3 kg)   SpO2 98%   BMI 32.64 kg/m²     Assessment: Hypertension blood pressure adequately controlled here but he gets nervous and goes higher other places even at home it is high most of the time as above. He will continue lisinopril 20 mg daily and amlodipine 10 mg daily and we will start hydrochlorothiazide 25 mg daily now and continue to use it through the time of his DOT physical on July 22.   He will not smoke the morning of the DOT physical and should not drink for at least 3 days before the DOT physical.  I told him to do this before and I emphasized the importance of this plan today as well as having the examiner check his blood pressure after sitting comfortably for 5 minutes, without him talking. Follow-up in about 5 or 6 weeks off hydrochlorothiazide and we will see what his blood pressure is doing and whether he needs to be on additional medication. His readings are fairly consistently higher at home than they are here. His wife is getting normal readings with the same meter. If he requires hydrochlorothiazide to hold his blood pressure down we could always start allopurinol but it would be preferable to not have to do that. This visit lasted 15 minutes, greater than 50% of the time spent counseling as above. Petros Rodríguez MD FACP    Please note: This document has been produced using voice recognition software. Unrecognized errors in transcription may be present.

## 2019-07-09 RX ORDER — AMLODIPINE BESYLATE 10 MG/1
TABLET ORAL
Qty: 90 TAB | Refills: 0 | Status: SHIPPED | OUTPATIENT
Start: 2019-07-09 | End: 2019-10-28 | Stop reason: SDUPTHER

## 2019-09-05 RX ORDER — LISINOPRIL 20 MG/1
TABLET ORAL
Qty: 90 TAB | Refills: 1 | Status: SHIPPED | OUTPATIENT
Start: 2019-09-05 | End: 2020-04-19

## 2019-10-28 RX ORDER — AMLODIPINE BESYLATE 10 MG/1
TABLET ORAL
Qty: 90 TAB | Refills: 3 | Status: SHIPPED | OUTPATIENT
Start: 2019-10-28 | End: 2021-01-07

## 2020-04-19 RX ORDER — LISINOPRIL 20 MG/1
TABLET ORAL
Qty: 90 TAB | Refills: 0 | Status: SHIPPED | OUTPATIENT
Start: 2020-04-19 | End: 2020-07-27

## 2020-05-09 ENCOUNTER — HOSPITAL ENCOUNTER (EMERGENCY)
Age: 55
Discharge: HOME | End: 2020-05-10
Attending: EMERGENCY MEDICINE
Payer: SUBSIDIZED

## 2020-05-09 ENCOUNTER — APPOINTMENT (OUTPATIENT)
Dept: CT IMAGING | Age: 55
End: 2020-05-09
Attending: EMERGENCY MEDICINE
Payer: SUBSIDIZED

## 2020-05-09 VITALS
HEART RATE: 80 BPM | TEMPERATURE: 97.6 F | WEIGHT: 250 LBS | RESPIRATION RATE: 18 BRPM | OXYGEN SATURATION: 97 % | BODY MASS INDEX: 32.08 KG/M2 | SYSTOLIC BLOOD PRESSURE: 159 MMHG | HEIGHT: 74 IN | DIASTOLIC BLOOD PRESSURE: 102 MMHG

## 2020-05-09 DIAGNOSIS — S02.2XXA CLOSED FRACTURE OF NASAL BONE, INITIAL ENCOUNTER: ICD-10-CM

## 2020-05-09 DIAGNOSIS — S00.31XA ABRASION, NOSE W/O INFECTION: ICD-10-CM

## 2020-05-09 DIAGNOSIS — S00.03XA CONTUSION OF SCALP, INITIAL ENCOUNTER: Primary | ICD-10-CM

## 2020-05-09 PROCEDURE — 99283 EMERGENCY DEPT VISIT LOW MDM: CPT

## 2020-05-09 PROCEDURE — 90471 IMMUNIZATION ADMIN: CPT

## 2020-05-09 PROCEDURE — 70450 CT HEAD/BRAIN W/O DYE: CPT

## 2020-05-09 PROCEDURE — 90714 TD VACC NO PRESV 7 YRS+ IM: CPT | Performed by: EMERGENCY MEDICINE

## 2020-05-09 PROCEDURE — 72125 CT NECK SPINE W/O DYE: CPT

## 2020-05-09 PROCEDURE — 74011250636 HC RX REV CODE- 250/636: Performed by: EMERGENCY MEDICINE

## 2020-05-09 RX ADMIN — TETANUS AND DIPHTHERIA TOXOIDS ADSORBED 0.5 ML: 2; 2 INJECTION INTRAMUSCULAR at 23:25

## 2020-05-10 PROCEDURE — 74011250637 HC RX REV CODE- 250/637: Performed by: EMERGENCY MEDICINE

## 2020-05-10 RX ORDER — ACETAMINOPHEN 325 MG/1
975 TABLET ORAL
Qty: 20 TAB | Refills: 0 | Status: SHIPPED | OUTPATIENT
Start: 2020-05-10

## 2020-05-10 RX ORDER — ACETAMINOPHEN 500 MG
1000 TABLET ORAL
Status: COMPLETED | OUTPATIENT
Start: 2020-05-10 | End: 2020-05-10

## 2020-05-10 RX ADMIN — ACETAMINOPHEN 1000 MG: 500 TABLET ORAL at 00:42

## 2020-05-10 NOTE — ED TRIAGE NOTES
Pt. Complains of falling straight down and hitting his face on asphalt. Pt states no LOC and he states he tripped and fell. Pt has not wounds to the face.

## 2020-05-10 NOTE — ED PROVIDER NOTES
HPI   Patient complains of falling straight down and hitting his face on asphalt. Pt states no LOC and he states he tripped and fell. Pt has wounds to the face. He denies having neck pain. Past Medical History:   Diagnosis Date    Hypertension        Past Surgical History:   Procedure Laterality Date    HX HEENT      HX ORTHOPAEDIC           Family History:   Problem Relation Age of Onset    Heart Disease Other         grandfather       Social History     Socioeconomic History    Marital status:      Spouse name: Not on file    Number of children: Not on file    Years of education: Not on file    Highest education level: Not on file   Occupational History    Not on file   Social Needs    Financial resource strain: Not on file    Food insecurity     Worry: Not on file     Inability: Not on file    Transportation needs     Medical: Not on file     Non-medical: Not on file   Tobacco Use    Smoking status: Current Every Day Smoker     Packs/day: 0.25    Smokeless tobacco: Never Used   Substance and Sexual Activity    Alcohol use:  Yes     Alcohol/week: 4.0 standard drinks     Types: 4 Cans of beer per week     Comment: a case of beer per week    Drug use: Yes     Types: Marijuana    Sexual activity: Yes     Partners: Female   Lifestyle    Physical activity     Days per week: Not on file     Minutes per session: Not on file    Stress: Not on file   Relationships    Social connections     Talks on phone: Not on file     Gets together: Not on file     Attends Church service: Not on file     Active member of club or organization: Not on file     Attends meetings of clubs or organizations: Not on file     Relationship status: Not on file    Intimate partner violence     Fear of current or ex partner: Not on file     Emotionally abused: Not on file     Physically abused: Not on file     Forced sexual activity: Not on file   Other Topics Concern    Not on file   Social History Narrative    Not on file         ALLERGIES: Amoxicillin and Pcn [penicillins]    Review of Systems   Constitutional: Negative. HENT: Negative. Nose injury, laceration of nose   Eyes: Negative. Respiratory: Negative. Cardiovascular: Negative. Gastrointestinal: Negative. Endocrine: Negative. Genitourinary: Negative. Musculoskeletal: Negative. Skin: Negative. Allergic/Immunologic: Negative. Neurological: Negative. Hematological: Negative. Psychiatric/Behavioral: Negative. All other systems reviewed and are negative. Vitals:    05/09/20 2255 05/09/20 2259   BP:  (!) 159/102   Pulse: 80    Resp: 18    Temp: 97.6 °F (36.4 °C)    SpO2: 97%    Weight: 113.4 kg (250 lb)    Height: 6' 2\" (1.88 m)             Physical Exam  Vitals signs and nursing note reviewed. Constitutional:       General: He is not in acute distress. Appearance: He is well-developed. HENT:      Head: Normocephalic. Nose:      Comments: (+) abrasion over nasal bridge, (+) edema and tenderness, no septal hematome  Eyes:      Conjunctiva/sclera: Conjunctivae normal.      Pupils: Pupils are equal, round, and reactive to light. Neck:      Musculoskeletal: Normal range of motion and neck supple. Cardiovascular:      Rate and Rhythm: Normal rate and regular rhythm. Heart sounds: Normal heart sounds. No murmur. Pulmonary:      Effort: Pulmonary effort is normal. No respiratory distress. Breath sounds: Normal breath sounds. No wheezing or rales. Chest:      Chest wall: No tenderness. Abdominal:      General: Bowel sounds are normal. There is no distension. Palpations: Abdomen is soft. Tenderness: There is no abdominal tenderness. There is no rebound. Musculoskeletal: Normal range of motion. General: No tenderness. Skin:     General: Skin is warm and dry. Findings: No rash. Neurological:      Mental Status: He is alert and oriented to person, place, and time. Cranial Nerves: No cranial nerve deficit. Motor: No abnormal muscle tone. Coordination: Coordination normal.   Psychiatric:         Behavior: Behavior normal.         Thought Content: Thought content normal.         Judgment: Judgment normal.          MDM  Number of Diagnoses or Management Options  Abrasion, nose w/o infection:   Closed fracture of nasal bone, initial encounter:   Contusion of scalp, initial encounter:          Procedures    CT scan of head: negative,  (+) nasal fx, CT neck - negative    Dx: abrasion of face, fx nose    Disp: D/C   home    Dictation disclaimer:  Please note that this dictation was completed with [employer], the [employer] voice recognition software. Quite often unanticipated grammatical, syntax, homophones, and other interpretive errors are inadvertently transcribed by the computer software. Please disregard these errors. Please excuse any errors that have escaped final proofreading.

## 2020-05-10 NOTE — DISCHARGE INSTRUCTIONS
Head Injury: Care Instructions  Your Care Instructions    Most injuries to the head are minor. Bumps, cuts, and scrapes on the head and face usually heal well and can be treated the same as injuries to other parts of the body. Although it's rare, once in a while a more serious problem shows up after you are home. So it's good to be on the lookout for symptoms for a day or two. Follow-up care is a key part of your treatment and safety. Be sure to make and go to all appointments, and call your doctor if you are having problems. It's also a good idea to know your test results and keep a list of the medicines you take. How can you care for yourself at home? · Follow your doctor's instructions. He or she will tell you if you need someone to watch you closely for the next 24 hours or longer. · Take it easy for the next few days or more if you are not feeling well. · Ask your doctor when it's okay for you to go back to activities like driving a car, riding a bike, or operating machinery. When should you call for help? Call 911 anytime you think you may need emergency care. For example, call if:    · You have a seizure.     · You passed out (lost consciousness).     · You are confused or can't stay awake.    Call your doctor now or seek immediate medical care if:    · You have new or worse vomiting.     · You feel less alert.     · You have new weakness or numbness in any part of your body.    Watch closely for changes in your health, and be sure to contact your doctor if:    · You do not get better as expected.     · You have new symptoms, such as headaches, trouble concentrating, or changes in mood. Where can you learn more? Go to http://marley-ciarra.info/  Enter M264 in the search box to learn more about \"Head Injury: Care Instructions. \"  Current as of: November 19, 2019Content Version: 12.4  © 8149-6002 Healthwise, Incorporated.   Care instructions adapted under license by [facility] (which disclaims liability or warranty for this information). If you have questions about a medical condition or this instruction, always ask your healthcare professional. [address] any warranty or liability for your use of this information. Patient Education        Scrapes (Abrasions): Care Instructions  Your Care Instructions  Scrapes (abrasions) are wounds where your skin has been rubbed or torn off. Most scrapes do not go deep into the skin, but some may remove several layers of skin. Scrapes usually don't bleed much, but they may ooze pinkish fluid. Scrapes on the head or face may appear worse than they are. They may bleed a lot because of the good blood supply to this area. Most scrapes heal well and may not need a bandage. They usually heal within 3 to 7 days. A large, deep scrape may take 1 to 2 weeks or longer to heal. A scab may form on some scrapes. Follow-up care is a key part of your treatment and safety. Be sure to make and go to all appointments, and call your doctor if you are having problems. It's also a good idea to know your test results and keep a list of the medicines you take. How can you care for yourself at home? · If your doctor told you how to care for your wound, follow your doctor's instructions. If you did not get instructions, follow this general advice:  ? Wash the scrape with clean water 2 times a day. Don't use hydrogen peroxide or alcohol, which can slow healing. ? You may cover the scrape with a thin layer of petroleum jelly, such as Vaseline, and a nonstick bandage. ? Apply more petroleum jelly and replace the bandage as needed. · Prop up the injured area on a pillow anytime you sit or lie down during the next 3 days. Try to keep it above the level of your heart. This will help reduce swelling. · Be safe with medicines. Take pain medicines exactly as directed.   ? If the doctor gave you a prescription medicine for pain, take it as prescribed. ? If you are not taking a prescription pain medicine, ask your doctor if you can take an over-the-counter medicine. When should you call for help? Call your doctor now or seek immediate medical care if:    · You have signs of infection, such as:  ? Increased pain, swelling, warmth, or redness around the scrape. ? Red streaks leading from the scrape. ? Pus draining from the scrape. ? A fever.     · The scrape starts to bleed, and blood soaks through the bandage. Oozing small amounts of blood is normal.    Watch closely for changes in your health, and be sure to contact your doctor if the scrape is not getting better each day. Where can you learn more? Go to http://marley-ciarra.info/  Enter A374 in the search box to learn more about \"Scrapes (Abrasions): Care Instructions. \"  Current as of: June 26, 2019Content Version: 12.4  © 1909-2213 [employer]. Care instructions adapted under license by [employer] (which disclaims liability or warranty for this information). If you have questions about a medical condition or this instruction, always ask your healthcare professional. [place] [address] any warranty or liability for your use of this information. Patient Education        Broken Nose: Care Instructions  Your Care Instructions    A broken nose is a break, or fracture, of the bone or cartilage. Most broken noses need only home care and a follow-up visit with a doctor. The swelling should go down in a few days. Bruises around your eyes and nose should go away in 2 to 3 weeks. You heal best when you take good care of yourself. Eat a variety of healthy foods, and don't smoke. Follow-up care is a key part of your treatment and safety. Be sure to make and go to all appointments, and call your doctor if you are having problems. It's also a good idea to know your test results and keep a list of the medicines you take.   How can you care for yourself at home? · If you have a nasal splint or packing, leave it in place until a doctor removes it. · If your doctor prescribed antibiotics, take them as directed. Do not stop taking them just because you feel better. You need to take the full course of antibiotics. · Take decongestants as directed to help you breathe after the splint or packing is removed. Your doctor may give you a prescription or suggest over-the-counter medicine. · Be safe with medicines. Take pain medicines exactly as directed. ? If the doctor gave you a prescription medicine for pain, take it as prescribed. ? If you are not taking a prescription pain medicine, ask your doctor if you can take an over-the-counter medicine. · Put ice or a cold pack on your nose for 10 to 20 minutes at a time. Try to do this every 1 to 2 hours for the first 3 days (when you are awake) or until the swelling goes down. Put a thin cloth between the ice pack and your skin. · Sleep with your head slightly raised until the swelling goes down. Prop up your head and shoulders on pillows. · Do not play contact sports for 6 weeks. When should you call for help? Call 911 anytime you think you may need emergency care. For example, call if:    · You have trouble breathing.     · You passed out (lost consciousness).    Call your doctor now or seek immediate medical care if:    · You have signs of infection, such as:  ? Increased pain, swelling, warmth, or redness. ? Red streaks leading from the area. ? Pus draining from the area. ? A fever.     · You have clear fluid draining from your nose.     · You have vision changes.     · Your nose is bleeding.     · You have new or worse pain.    Watch closely for changes in your health, and be sure to contact your doctor if:    · You do not get better as expected. Where can you learn more?   Go to http://marley-ciarra.info/  Enter Y345 in the search box to learn more about \"Broken Nose: Care Instructions. \"  Current as of: June 26, 2019Content Version: 12.4  © 9107-7442 [employer]. Care instructions adapted under license by [employer] (which disclaims liability or warranty for this information). If you have questions about a medical condition or this instruction, always ask your healthcare professional. [address] any warranty or liability for your use of this information. Patient Education        Head Injury: Care Instructions  Your Care Instructions    Most injuries to the head are minor. Bumps, cuts, and scrapes on the head and face usually heal well and can be treated the same as injuries to other parts of the body. Although it's rare, once in a while a more serious problem shows up after you are home. So it's good to be on the lookout for symptoms for a day or two. Follow-up care is a key part of your treatment and safety. Be sure to make and go to all appointments, and call your doctor if you are having problems. It's also a good idea to know your test results and keep a list of the medicines you take. How can you care for yourself at home? · Follow your doctor's instructions. He or she will tell you if you need someone to watch you closely for the next 24 hours or longer. · Take it easy for the next few days or more if you are not feeling well. · Ask your doctor when it's okay for you to go back to activities like driving a car, riding a bike, or operating machinery. When should you call for help? Call 911 anytime you think you may need emergency care.  For example, call if:    · You have a seizure.     · You passed out (lost consciousness).     · You are confused or can't stay awake.    Call your doctor now or seek immediate medical care if:    · You have new or worse vomiting.     · You feel less alert.     · You have new weakness or numbness in any part of your body.    Watch closely for changes in your health, and be sure to contact your doctor if:    · You do not get better as expected.     · You have new symptoms, such as headaches, trouble concentrating, or changes in mood. Where can you learn more? Go to http://marley-ciarra.info/  Enter M264 in the search box to learn more about \"Head Injury: Care Instructions. \"  Current as of: November 19, 2019Content Version: 12.4  © 3027-9010 Healthwise, Incorporated. Care instructions adapted under license by [employer] (which disclaims liability or warranty for this information). If you have questions about a medical condition or this instruction, always ask your healthcare professional. [address] any warranty or liability for your use of this information.

## 2020-05-10 NOTE — ED NOTES
Pt wound was cleansed and bleeding is controlled. Bandages placed and MD assessed wounds. Pt tolerated well at this time. MD aware.

## 2020-05-28 ENCOUNTER — TELEPHONE (OUTPATIENT)
Dept: INTERNAL MEDICINE CLINIC | Age: 55
End: 2020-05-28

## 2020-05-28 NOTE — TELEPHONE ENCOUNTER
Patient stating his bp has been running somewhat high. He started taking leftover water pills he had and that brought it back down. He's been keeping track of his BP. 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 Do you want to see him in office or virtually?

## 2020-06-05 ENCOUNTER — HOSPITAL ENCOUNTER (OUTPATIENT)
Dept: LAB | Age: 55
Discharge: HOME | End: 2020-06-05
Payer: SUBSIDIZED

## 2020-06-05 ENCOUNTER — OFFICE VISIT (OUTPATIENT)
Dept: INTERNAL MEDICINE CLINIC | Age: 55
End: 2020-06-05

## 2020-06-05 VITALS
TEMPERATURE: 97.9 F | HEIGHT: 74 IN | DIASTOLIC BLOOD PRESSURE: 90 MMHG | HEART RATE: 68 BPM | RESPIRATION RATE: 14 BRPM | WEIGHT: 256 LBS | SYSTOLIC BLOOD PRESSURE: 144 MMHG | BODY MASS INDEX: 32.85 KG/M2

## 2020-06-05 DIAGNOSIS — I10 PRIMARY HYPERTENSION: Primary | ICD-10-CM

## 2020-06-05 DIAGNOSIS — I10 PRIMARY HYPERTENSION: ICD-10-CM

## 2020-06-05 DIAGNOSIS — Z87.39 HISTORY OF GOUT: ICD-10-CM

## 2020-06-05 DIAGNOSIS — B35.6 TINEA CRURIS: ICD-10-CM

## 2020-06-05 LAB
ALBUMIN SERPL-MCNC: 4.2 G/DL (ref 3.4–5)
ALBUMIN/GLOB SERPL: 1.1 {RATIO} (ref 0.8–1.7)
ALP SERPL-CCNC: 91 U/L (ref 45–117)
ALT SERPL-CCNC: 63 U/L (ref 16–61)
ANION GAP SERPL CALC-SCNC: 7 MMOL/L (ref 3–18)
AST SERPL-CCNC: 27 U/L (ref 10–38)
BASOPHILS # BLD: 0 K/UL (ref 0–0.1)
BASOPHILS NFR BLD: 1 % (ref 0–2)
BILIRUB SERPL-MCNC: 0.6 MG/DL (ref 0.2–1)
BUN SERPL-MCNC: 16 MG/DL (ref 7–18)
BUN/CREAT SERPL: 14 (ref 12–20)
CALCIUM SERPL-MCNC: 9.5 MG/DL (ref 8.5–10.1)
CHLORIDE SERPL-SCNC: 99 MMOL/L (ref 100–111)
CO2 SERPL-SCNC: 29 MMOL/L (ref 21–32)
CREAT SERPL-MCNC: 1.18 MG/DL (ref 0.6–1.3)
DIFFERENTIAL METHOD BLD: NORMAL
EOSINOPHIL # BLD: 0.3 K/UL (ref 0–0.4)
EOSINOPHIL NFR BLD: 4 % (ref 0–5)
ERYTHROCYTE [DISTWIDTH] IN BLOOD BY AUTOMATED COUNT: 12.1 % (ref 11.6–14.5)
GLOBULIN SER CALC-MCNC: 4 G/DL (ref 2–4)
GLUCOSE SERPL-MCNC: 108 MG/DL (ref 74–99)
HCT VFR BLD AUTO: 43.3 % (ref 36–48)
HGB BLD-MCNC: 15 G/DL (ref 13–16)
LYMPHOCYTES # BLD: 1.7 K/UL (ref 0.9–3.6)
LYMPHOCYTES NFR BLD: 27 % (ref 21–52)
MCH RBC QN AUTO: 31 PG (ref 24–34)
MCHC RBC AUTO-ENTMCNC: 34.6 G/DL (ref 31–37)
MCV RBC AUTO: 89.5 FL (ref 74–97)
MONOCYTES # BLD: 0.6 K/UL (ref 0.05–1.2)
MONOCYTES NFR BLD: 10 % (ref 3–10)
NEUTS SEG # BLD: 3.6 K/UL (ref 1.8–8)
NEUTS SEG NFR BLD: 58 % (ref 40–73)
PLATELET # BLD AUTO: 334 K/UL (ref 135–420)
PMV BLD AUTO: 9.5 FL (ref 9.2–11.8)
POTASSIUM SERPL-SCNC: 4.3 MMOL/L (ref 3.5–5.5)
PROT SERPL-MCNC: 8.2 G/DL (ref 6.4–8.2)
RBC # BLD AUTO: 4.84 M/UL (ref 4.7–5.5)
SODIUM SERPL-SCNC: 135 MMOL/L (ref 136–145)
URATE SERPL-MCNC: 9.4 MG/DL (ref 2.6–7.2)
WBC # BLD AUTO: 6.2 K/UL (ref 4.6–13.2)

## 2020-06-05 PROCEDURE — 85025 COMPLETE CBC W/AUTO DIFF WBC: CPT

## 2020-06-05 PROCEDURE — 80053 COMPREHEN METABOLIC PANEL: CPT

## 2020-06-05 PROCEDURE — 84550 ASSAY OF BLOOD/URIC ACID: CPT

## 2020-06-05 PROCEDURE — 36415 COLL VENOUS BLD VENIPUNCTURE: CPT

## 2020-06-05 RX ORDER — CHLORTHALIDONE 25 MG/1
25 TABLET ORAL DAILY
Qty: 30 TAB | Refills: 1 | Status: SHIPPED | OUTPATIENT
Start: 2020-06-05 | End: 2020-07-27

## 2020-06-05 RX ORDER — INDOMETHACIN 50 MG/1
50 CAPSULE ORAL 3 TIMES DAILY
Qty: 90 CAP | Refills: 2 | Status: SHIPPED | OUTPATIENT
Start: 2020-06-05 | End: 2020-09-03

## 2020-06-05 RX ORDER — CLOTRIMAZOLE AND BETAMETHASONE DIPROPIONATE 10; .64 MG/G; MG/G
CREAM TOPICAL
Qty: 15 G | Refills: 1 | Status: SHIPPED | OUTPATIENT
Start: 2020-06-05 | End: 2021-03-19 | Stop reason: SDUPTHER

## 2020-06-05 NOTE — PROGRESS NOTES
Anay Nguyen III,born 1965, is a 47 y.o. male, who is seen today for concerns about high blood pressure, gout, rash, obesity. He has had high blood pressure the last several years and has been using his medicine regularly but has not used hydrochlorothiazide for about a year. When he started checking his blood pressure again recently because of an upcoming DOT evaluation his blood pressure was high, 143/103 on 1 occasion and then quite a bit lower when he restarted hydrochlorothiazide 25 mg daily. He has been on hydrochlorothiazide again since May 24. He had an episode of gout and this great toe several weeks ago and that cleared quickly with indomethacin. He has cut down on alcohol consumption but still smokes. For the last few years he has had a slightly itchy rash between the creases of his buttocks and in the inguinal regions and it sounds like he has been on antifungal medicine in the past which helped him but he has not been on anything recently for the rash. Past Medical History:   Diagnosis Date    Hypertension      Current Outpatient Medications   Medication Sig Dispense Refill    acetaminophen (TYLENOL) 325 mg tablet Take 3 Tabs by mouth every six (6) hours as needed for Pain (headache). 20 Tab 0    lisinopriL (PRINIVIL, ZESTRIL) 20 mg tablet Take 1 tablet by mouth once daily 90 Tab 0    amLODIPine (NORVASC) 10 mg tablet TAKE 1 TABLET BY MOUTH ONCE DAILY 90 Tab 3    hydroCHLOROthiazide (HYDRODIURIL) 25 mg tablet Take 1 Tab by mouth daily. 10 Tab 0     Visit Vitals  /90 (BP 1 Location: Right arm, BP Patient Position: Sitting)   Pulse 68   Temp 97.9 °F (36.6 °C) (Temporal)   Resp 14   Ht 6' 2\" (1.88 m)   Wt 256 lb (116.1 kg)   BMI 32.87 kg/m²     Blood pressure was the same in both arms when I checked it as above. Carotids are 2+ without bruits. Lungs are clear to percussion. Good breath sounds with no wheezing or crackles.   Heart reveals a regular rhythm with normal S1 and S2 no murmur rub. Apical impulse is not palpable. Abdomen is obese soft nontender with no hepatosplenomegaly or masses and no bruits. Extremities reveal no clubbing cyanosis or edema. Pulses are 2+. Between the creases of his buttocks and in the inguinal region there is some flakiness, minimal redness with no satellite lesions and no tenderness. Assessment: #1. Hypertension is inadequately controlled. He will continue amlodipine 10 mg daily, lisinopril 20 mg daily and we will switch from hydrochlorothiazide to chlorthalidone 25 mg daily. He will continue no added salt diet and work on weight loss and minimize alcohol use. He will monitor blood pressure and bring those readings to his next visit in about a month. #2.  History of gout was last uric acid level 10.5 3 years ago, will check uric acid level now and if he needs to stay on chlorthalidone or similar he likely will need allopurinol. We discussed that at length. #3.  Mild obesity, encouraged him to work much harder on weight loss and we discussed that at length. #4.  Cigarette smoker, he understands that he should cut back on smoking and quit smoking. #5.  History of excess alcohol intake doing much better, I have asked him to minimize alcohol intake, perhaps as low as 0 at least until after his DOT physical.  He is currently not working but expects to be working again in a couple months and DOT physical should be the latter part of July. #6.  Rash consistent with intertrigo, some component is related to yeast, will try treating with Lotrisone, thin layer twice a day to these areas of rash. We will check lab and call results when available, probably Monday. Follow-up soon after 4 July at his request.      Manolo Roberts. Aziza Estrada MD FACP    Please note: This document has been produced using voice recognition software. Unrecognized errors in transcription may be present.

## 2020-06-08 NOTE — PROGRESS NOTES
Please notify the patient that his lab looks good except uric acid level is still high, a little better than last year. If he is getting attacks of gout more than a couple of times a year we will start him on allopurinol, will discuss that at his visit next month.

## 2020-06-09 ENCOUNTER — TELEPHONE (OUTPATIENT)
Dept: INTERNAL MEDICINE CLINIC | Age: 55
End: 2020-06-09

## 2020-06-09 NOTE — TELEPHONE ENCOUNTER
----- Message from Emiliano Perez MD sent at 6/8/2020  5:24 PM EDT -----  Please notify the patient that his lab looks good except uric acid level is still high, a little better than last year. If he is getting attacks of gout more than a couple of times a year we will start him on allopurinol, will discuss that at his visit next month.

## 2021-01-08 ENCOUNTER — TELEPHONE (OUTPATIENT)
Dept: INTERNAL MEDICINE CLINIC | Age: 56
End: 2021-01-08

## 2021-01-08 DIAGNOSIS — Z87.39 HISTORY OF GOUT: ICD-10-CM

## 2021-01-08 DIAGNOSIS — I10 PRIMARY HYPERTENSION: Primary | ICD-10-CM

## 2021-01-08 DIAGNOSIS — E78.5 HYPERLIPIDEMIA LDL GOAL <130: ICD-10-CM

## 2021-02-26 ENCOUNTER — HOSPITAL ENCOUNTER (OUTPATIENT)
Dept: LAB | Age: 56
Discharge: HOME | End: 2021-02-26

## 2021-02-26 ENCOUNTER — APPOINTMENT (OUTPATIENT)
Dept: INTERNAL MEDICINE CLINIC | Age: 56
End: 2021-02-26

## 2021-02-26 DIAGNOSIS — Z87.39 HISTORY OF GOUT: ICD-10-CM

## 2021-02-26 DIAGNOSIS — I10 PRIMARY HYPERTENSION: ICD-10-CM

## 2021-02-26 DIAGNOSIS — E78.5 HYPERLIPIDEMIA LDL GOAL <130: ICD-10-CM

## 2021-02-26 LAB
ALBUMIN SERPL-MCNC: 3.9 G/DL (ref 3.4–5)
ALBUMIN/GLOB SERPL: 1.2 {RATIO} (ref 0.8–1.7)
ALP SERPL-CCNC: 87 U/L (ref 45–117)
ALT SERPL-CCNC: 55 U/L (ref 16–61)
ANION GAP SERPL CALC-SCNC: 6 MMOL/L (ref 3–18)
AST SERPL-CCNC: 31 U/L (ref 10–38)
BILIRUB SERPL-MCNC: 0.4 MG/DL (ref 0.2–1)
BUN SERPL-MCNC: 10 MG/DL (ref 7–18)
BUN/CREAT SERPL: 10 (ref 12–20)
CALCIUM SERPL-MCNC: 8.7 MG/DL (ref 8.5–10.1)
CHLORIDE SERPL-SCNC: 105 MMOL/L (ref 100–111)
CHOLEST SERPL-MCNC: 235 MG/DL
CO2 SERPL-SCNC: 27 MMOL/L (ref 21–32)
CREAT SERPL-MCNC: 1.03 MG/DL (ref 0.6–1.3)
GLOBULIN SER CALC-MCNC: 3.3 G/DL (ref 2–4)
GLUCOSE SERPL-MCNC: 93 MG/DL (ref 74–99)
HDLC SERPL-MCNC: 35 MG/DL (ref 40–60)
HDLC SERPL: 6.7 {RATIO} (ref 0–5)
LDLC SERPL CALC-MCNC: 123.6 MG/DL (ref 0–100)
LIPID PROFILE,FLP: ABNORMAL
POTASSIUM SERPL-SCNC: 4.9 MMOL/L (ref 3.5–5.5)
PROT SERPL-MCNC: 7.2 G/DL (ref 6.4–8.2)
SODIUM SERPL-SCNC: 138 MMOL/L (ref 136–145)
TRIGL SERPL-MCNC: 382 MG/DL (ref ?–150)
URATE SERPL-MCNC: 9.5 MG/DL (ref 2.6–7.2)
VLDLC SERPL CALC-MCNC: 76.4 MG/DL

## 2021-02-26 PROCEDURE — 80061 LIPID PANEL: CPT

## 2021-02-26 PROCEDURE — 84550 ASSAY OF BLOOD/URIC ACID: CPT

## 2021-02-26 PROCEDURE — 80053 COMPREHEN METABOLIC PANEL: CPT

## 2021-03-19 ENCOUNTER — OFFICE VISIT (OUTPATIENT)
Dept: INTERNAL MEDICINE CLINIC | Age: 56
End: 2021-03-19

## 2021-03-19 VITALS
DIASTOLIC BLOOD PRESSURE: 90 MMHG | WEIGHT: 259.6 LBS | HEART RATE: 69 BPM | RESPIRATION RATE: 16 BRPM | TEMPERATURE: 98 F | BODY MASS INDEX: 33.32 KG/M2 | HEIGHT: 74 IN | OXYGEN SATURATION: 98 % | SYSTOLIC BLOOD PRESSURE: 134 MMHG

## 2021-03-19 DIAGNOSIS — I10 PRIMARY HYPERTENSION: Primary | ICD-10-CM

## 2021-03-19 DIAGNOSIS — Z78.9 MODERATE ALCOHOL CONSUMPTION: ICD-10-CM

## 2021-03-19 DIAGNOSIS — L29.8 CHRONIC PRURITIC RASH IN ADULT: ICD-10-CM

## 2021-03-19 DIAGNOSIS — F17.200 CURRENT SMOKER: ICD-10-CM

## 2021-03-19 DIAGNOSIS — Z00.00 HEALTHCARE MAINTENANCE: ICD-10-CM

## 2021-03-19 DIAGNOSIS — M10.00 IDIOPATHIC GOUT, UNSPECIFIED CHRONICITY, UNSPECIFIED SITE: ICD-10-CM

## 2021-03-19 DIAGNOSIS — E78.2 MIXED HYPERLIPIDEMIA: ICD-10-CM

## 2021-03-19 PROCEDURE — 99215 OFFICE O/P EST HI 40 MIN: CPT | Performed by: NURSE PRACTITIONER

## 2021-03-19 RX ORDER — ATORVASTATIN CALCIUM 20 MG/1
20 TABLET, FILM COATED ORAL DAILY
Qty: 180 TAB | Refills: 0 | Status: SHIPPED | OUTPATIENT
Start: 2021-03-19 | End: 2021-08-06 | Stop reason: SDUPTHER

## 2021-03-19 RX ORDER — AMLODIPINE BESYLATE 10 MG/1
10 TABLET ORAL DAILY
Qty: 180 TAB | Refills: 0 | Status: SHIPPED | OUTPATIENT
Start: 2021-03-19 | End: 2021-08-06 | Stop reason: SDUPTHER

## 2021-03-19 RX ORDER — ALLOPURINOL 100 MG/1
100 TABLET ORAL DAILY
Qty: 180 TAB | Refills: 0 | Status: SHIPPED | OUTPATIENT
Start: 2021-03-19 | End: 2021-08-06 | Stop reason: SINTOL

## 2021-03-19 RX ORDER — LISINOPRIL AND HYDROCHLOROTHIAZIDE 12.5; 2 MG/1; MG/1
1 TABLET ORAL DAILY
Qty: 180 TAB | Refills: 0 | Status: SHIPPED | OUTPATIENT
Start: 2021-03-19 | End: 2021-08-06 | Stop reason: SDUPTHER

## 2021-03-19 RX ORDER — CLOTRIMAZOLE AND BETAMETHASONE DIPROPIONATE 10; .64 MG/G; MG/G
CREAM TOPICAL
Qty: 15 G | Refills: 1 | Status: SHIPPED | OUTPATIENT
Start: 2021-03-19 | End: 2022-05-05

## 2021-03-19 NOTE — PROGRESS NOTES
[facility] [address] [facility]  [place], [address]  [phone] KM/[phone] fax    3/19/2021    HPI:   Belén Hernandez 1965 is a pleasant WHITE male who presents today to establish care and for routine physical exam.  Past medical history to be addressed today: Hypertension, gout, chronic rash. Patient does not have medical insurance. 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 He admits to smoking 4 cigarettes a day and consumes 12 pack of alcohol within 3 days. He consumes moderate amount of sodium in his diet as well. He complains of frequent gout flareups mainly in his left wrist.  He was placed on colchicine to take as needed. He does consume alcohol, chocolate, shellfish-these cause his gout flare. Chronic rash to right upper chest and buttocks area. He has been dealing with this rash for quite some time. He does use Lotrisone topical cream which appears to be somewhat effective. He is seeking to go to dermatology for consult. Patient will be scheduling that appointment. Past Medical History:   Diagnosis Date    Hypertension      Past Surgical History:   Procedure Laterality Date    HX HEENT      HX ORTHOPAEDIC       Current Outpatient Medications   Medication Sig    lisinopril-hydroCHLOROthiazide (PRINZIDE, ZESTORETIC) 20-12.5 mg per tablet Take 1 Tab by mouth daily. For blood pressure    atorvastatin (LIPITOR) 20 mg tablet Take 1 Tab by mouth daily.  amLODIPine (NORVASC) 10 mg tablet Take 1 Tab by mouth daily.  allopurinoL (ZYLOPRIM) 100 mg tablet Take 1 Tab by mouth daily.  Indications: treatment to prevent acute gout attack    clotrimazole-betamethasone (LOTRISONE) topical cream Apply a thin layer to rash twice a day    acetaminophen (TYLENOL) 325 mg tablet Take 3 Tabs by mouth every six (6) hours as needed for Pain (headache). No current facility-administered medications for this visit. Allergies and Intolerances: Allergies   Allergen Reactions    Amoxicillin Anaphylaxis    Pcn [Penicillins] Anaphylaxis     As a child     Family History:   Family History   Problem Relation Age of Onset    Heart Disease Other         grandfather     Social History:   He  reports that he has been smoking. He has been smoking about 0.25 packs per day. He has never used smokeless tobacco.   Social History     Substance and Sexual Activity   Alcohol Use Yes    Alcohol/week: 4.0 standard drinks    Types: 4 Cans of beer per week    Comment: a case of beer per week     Immunization History:  Immunization History   Administered Date(s) Administered    Td, Adsorbed 05/09/2020       Review of Systems:   As above included in HPI. Otherwise 11 point review of systems negative including constitutional, skin, HENT, eyes, respiratory, cardiovascular, gastrointestinal, genitourinary, musculoskeletal, endocrine, hematologic, allergy, and neurologic. Physical:   Visit Vitals  BP (!) 134/90   Pulse 69   Temp 98 °F (36.7 °C) (Temporal)   Resp 16   Ht 6' 2\" (1.88 m)   Wt 259 lb 9.6 oz (117.8 kg)   SpO2 98%   BMI 33.33 kg/m²      Wt Readings from Last 3 Encounters:   03/19/21 259 lb 9.6 oz (117.8 kg)   06/05/20 256 lb (116.1 kg)   05/09/20 250 lb (113.4 kg)         Exam:   Physical Exam  Vitals signs and nursing note reviewed. Constitutional:       Appearance: Normal appearance. He is obese. Comments: Morbidly   HENT:      Head: Normocephalic and atraumatic. Right Ear: External ear normal.      Left Ear: External ear normal.      Nose: Nose normal.      Mouth/Throat:      Mouth: Mucous membranes are moist.   Eyes:      Extraocular Movements: Extraocular movements intact. Pupils: Pupils are equal, round, and reactive to light.    Neck:      Musculoskeletal: Normal range of motion and neck supple. Vascular: No carotid bruit. Cardiovascular:      Rate and Rhythm: Normal rate and regular rhythm. Heart sounds: Normal heart sounds. Comments: No edema noted  Pulmonary:      Effort: Pulmonary effort is normal. No respiratory distress. Breath sounds: Normal breath sounds. No wheezing. Abdominal:      General: Abdomen is flat. Palpations: Abdomen is soft. Musculoskeletal: Normal range of motion. Comments: Gait stable   Skin:     General: Skin is warm and dry. Comments: Chronic rash not visible today. Neurological:      General: No focal deficit present. Mental Status: He is alert and oriented to person, place, and time. Psychiatric:         Mood and Affect: Mood normal.         Behavior: Behavior normal.         Thought Content: Thought content normal.         Review of Data:  Labs reviewed: yes  ;   Uric acid 9.5    Impression:  Patient Active Problem List   Diagnosis Code    Primary hypertension I10    Hyperlipidemia LDL goal <130 E78.5    History of gout Z87.39    Obesity (BMI 30-39. 9) E66.9    Strain of back S39.012A       Plan:    ICD-10-CM ICD-9-CM    1. Primary hypertension  I10 401.9 lisinopril-hydroCHLOROthiazide (PRINZIDE, ZESTORETIC) 20-12.5 mg per tablet      amLODIPine (NORVASC) 10 mg tablet   2. Mixed hyperlipidemia  E78.2 272.2 atorvastatin (LIPITOR) 20 mg tablet      LIPID PANEL   3. Idiopathic gout, unspecified chronicity, unspecified site  M10.00 274.9 allopurinoL (ZYLOPRIM) 100 mg tablet   4. Chronic pruritic rash in adult  L29.8 698.8    5. Moderate alcohol consumption  Z78.9 V49.89    6. Current smoker  F17.200 305.1    7. Healthcare maintenance  Z00.00 V70.0      1. Encounter to establish care with new provider. Patient is transferring to me from their previous provider. I spent no less than 20 minutes reviewing and updating the chart to include previous labs, diagnostics, and specialty notes.   2. Primary hypertension. Despite patient taking amlodipine 10 mg and lisinopril 20 mg daily, his diastolic remains elevated at 90. Looking back at his previous diastolic readings, this is a chronic issue. 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 After much discussion about diet, exercise, smoking cessation, increased water intake, reducing alcohol intake decided to discontinue lisinopril 20 mg and initiate Prinzide 20/12.5 mg daily. Patient to continue amlodipine 10 mg daily. He is to discontinue Hygroton 25 mg. Patient to obtain blood pressure cuff to go on his wrist, as the cuff he has at home is approximately 11years old, he is to check his blood pressure every day 2 hours after taking his blood pressure medication and record these results. In 2 weeks patient is to call the office with results to verify new medication regimen is effective. Due to patient not having insurance, if his BPs remain elevated I will manage his blood pressure via phone so he is not charged for the visit. 3.  Gout. Uric acid level is 9.5. Patient was taking colchicine in the past which was very effective for the gout flareup in his left hand. He knows what causes his gout flareup i.e. alcohol consumption, shellfish, chocolate but he has not changed his diet or ways of living. Will initiate allopurinol 100 mg daily for preventative maintenance and if he has gout flareups in between, will consider colchicine therapy as well. 4.  Chronic rash. Patient utilizes Lotrisone cream as needed for the rash right upper chest and buttocks area. These are not visible today but will refill medication for when he has a flareup. Strongly suggested patient seek dermatology consult to obtain a definitive diagnosis along with treatment options. 5.  Moderate alcohol consumption. Patient drinks approximately 12 pack of beers and 3 days.   He enjoys drinking his beer and has stated that he would probably try to cut back but this was not for sure. Instructed patient his alcohol consumption is most likely affecting his blood pressure, his cholesterol, and his weight gain. 6.  Mixed hyperlipidemia. ;  levels are elevated. Goal is <150. Instruct pt on low-fat diet, avoidance of refined carbohydrates and alcohol. Instructed patient on the importance of diet and exercise as well as alcohol reduction in order to reduce his cholesterol levels. Patient admits to not being able to work on these items and has requested to initiate statin therapy. Foods to avoid  Butter and margarine are simply triglycerides in a stick or tub form. Palm, coconut oil,  Lard. Eating candy and sugary foods. Fatty red meat, poultry skin, butter, high-fat dairy products and shellfish. 7.  Current smoker. Counseled patient on the dangers of tobacco use, and was advised to quit. Reviewed strategies to maximize success, including the use of Chantix. Discussed the risks of continued tobacco use such as elevated blood pressure, vascular irritation with increased incidence of CVD with stroke or MI and PVD causing claudication, lung damage that could lead to COPD, cancer and death. Encouraged an approach to find a few healthy habits, write them down then plan to decrease their cigarette use by one each week till they are gone all together and to plan for stress that may cause them to want to restart and how to prevent it by having new coping mechanisms in place. 1-800-QUIT-NOW provided for counseling   8. Healthcare maintenance.   Due to the lack of health insurance patient is not wanting to have his colonoscopy or his pneumococcal vaccinations despite him being high risk due to obesity, hypertension, mixed hyperlipidemia, smoker        Follow up 6 months  Labs needed 1 week prior to appt: Yes  Lipid    Dr. Isrrael Carreon, AGNP-C, DNP  [facility] 5:   45 minutes with the patient on counseling, answering questions, and/or coordination of care. Complex medical review of medical history, lab results, and testing. Complicated management plan formulated.

## 2021-03-19 NOTE — PROGRESS NOTES
Chief Complaint   Patient presents with   [address]     done 2/26/2021       1. Have you been to the ER, urgent care clinic since your last visit? Hospitalized since your last visit? Yes, ER for fall/cut on face couple months. 2. Have you seen or consulted any other health care providers outside of the [address] [place] [address] since your last visit? Include any pap smears or colon screening.  No

## 2021-08-06 ENCOUNTER — OFFICE VISIT (OUTPATIENT)
Dept: INTERNAL MEDICINE CLINIC | Age: 56
End: 2021-08-06

## 2021-08-06 ENCOUNTER — TELEPHONE (OUTPATIENT)
Dept: INTERNAL MEDICINE CLINIC | Age: 56
End: 2021-08-06

## 2021-08-06 VITALS
WEIGHT: 248.8 LBS | BODY MASS INDEX: 31.93 KG/M2 | RESPIRATION RATE: 16 BRPM | SYSTOLIC BLOOD PRESSURE: 121 MMHG | TEMPERATURE: 97.8 F | OXYGEN SATURATION: 100 % | DIASTOLIC BLOOD PRESSURE: 83 MMHG | HEIGHT: 74 IN | HEART RATE: 72 BPM

## 2021-08-06 DIAGNOSIS — E78.2 MIXED HYPERLIPIDEMIA: ICD-10-CM

## 2021-08-06 DIAGNOSIS — Z11.59 NEED FOR HEPATITIS C SCREENING TEST: ICD-10-CM

## 2021-08-06 DIAGNOSIS — Z28.21 COVID-19 VACCINATION DECLINED: ICD-10-CM

## 2021-08-06 DIAGNOSIS — I10 PRIMARY HYPERTENSION: Primary | ICD-10-CM

## 2021-08-06 DIAGNOSIS — M10.00 ACUTE IDIOPATHIC GOUT, UNSPECIFIED SITE: ICD-10-CM

## 2021-08-06 PROBLEM — S39.012A STRAIN OF BACK: Status: RESOLVED | Noted: 2019-05-03 | Resolved: 2021-08-06

## 2021-08-06 PROCEDURE — 99214 OFFICE O/P EST MOD 30 MIN: CPT | Performed by: NURSE PRACTITIONER

## 2021-08-06 RX ORDER — LISINOPRIL AND HYDROCHLOROTHIAZIDE 12.5; 2 MG/1; MG/1
1 TABLET ORAL DAILY
Qty: 180 TABLET | Refills: 0 | Status: SHIPPED | OUTPATIENT
Start: 2021-08-06 | End: 2021-10-04

## 2021-08-06 RX ORDER — COLCHICINE 0.6 MG/1
0.6 CAPSULE ORAL
Qty: 30 CAPSULE | Refills: 0 | Status: SHIPPED | OUTPATIENT
Start: 2021-08-06 | End: 2022-01-21 | Stop reason: SDUPTHER

## 2021-08-06 RX ORDER — AMLODIPINE BESYLATE 10 MG/1
10 TABLET ORAL DAILY
Qty: 180 TABLET | Refills: 0 | Status: SHIPPED | OUTPATIENT
Start: 2021-08-06 | End: 2021-10-04

## 2021-08-06 RX ORDER — ATORVASTATIN CALCIUM 20 MG/1
20 TABLET, FILM COATED ORAL DAILY
Qty: 180 TABLET | Refills: 0 | Status: SHIPPED | OUTPATIENT
Start: 2021-08-06 | End: 2021-10-04

## 2021-08-06 NOTE — PROGRESS NOTES
[facility] [place] [address] [address] [facility], [address]  [phone] YBDGSELENA/[phone] fax    8/6/2021    HPI:   Justin Gresham 1965 is a pleasant WHITE/NON- male who presents today for routine follow up and reevaluation of hypertension, cholesterol, and gout. 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 He continues amloipine and Prinzide daily for hypertension and is tolerating therapy well. He consumes moderate amount of sodium in his diet. He enjoys [address] on his seafood. He has not experienced a gout flareup in over a month. He discontinued the allopurinol 1 month ago due to causing joint pain. A few days later he had some discomfort to his left wrist (which is where is gout mainly occurs with the occasional flare on the top of his right foot); he took colchicine and discomfort subsided. He was placed on colchicine to take as needed. He does consume alcohol, chocolate, shellfish-these cause his gout flare. He continues atorvastatin therapy for his elevated cholesterol levels. His diet is heavy with fried fatty foods and will be difficult for him to change but he will work on it. He canceled his labs for today's appointment because he was unaware that he had [employer] and did not want to develop a bill. He is 100% against the Covid vaccine and will not be obtaining it. Past Medical History:   Diagnosis Date    Hypertension      Past Surgical History:   Procedure Laterality Date    HX HEENT      HX ORTHOPAEDIC       Current Outpatient Medications   Medication Sig    colchicine (MITIGARE) 0.6 mg capsule Take 1 Capsule by mouth daily as needed (gout flare). Indications: treatment to prevent acute gout attack    lisinopril-hydroCHLOROthiazide (PRINZIDE, ZESTORETIC) 20-12.5 mg per tablet Take 1 Tablet by mouth daily. For blood pressure    atorvastatin (LIPITOR) 20 mg tablet Take 1 Tablet by mouth daily.     amLODIPine (NORVASC) 10 mg tablet Take 1 Tablet by mouth daily.  clotrimazole-betamethasone (LOTRISONE) topical cream Apply a thin layer to rash twice a day    acetaminophen (TYLENOL) 325 mg tablet Take 3 Tabs by mouth every six (6) hours as needed for Pain (headache). No current facility-administered medications for this visit. Allergies and Intolerances: Allergies   Allergen Reactions    Amoxicillin Anaphylaxis    Allopurinol Other (comments)     Joint pain    Pcn [Penicillins] Anaphylaxis     As a child     Family History:   Family History   Problem Relation Age of Onset    Heart Disease Other         grandfather     Social History:   He  reports that he has been smoking. He has been smoking about 0.25 packs per day. He has never used smokeless tobacco.   Social History     Substance and Sexual Activity   Alcohol Use Yes    Alcohol/week: 4.0 standard drinks    Types: 4 Cans of beer per week    Comment: a case of beer per week     Immunization History:  Immunization History   Administered Date(s) Administered    Td, Adsorbed 05/09/2020       Review of Systems:   As above included in HPI. Otherwise 11 point review of systems negative including constitutional, skin, HENT, eyes, respiratory, cardiovascular, gastrointestinal, genitourinary, musculoskeletal, endocrine, hematologic, allergy, and neurologic. Physical:   Visit Vitals  /83   Pulse 72   Temp 97.8 °F (36.6 °C) (Temporal)   Resp 16   Ht 6' 2\" (1.88 m)   Wt 248 lb 12.8 oz (112.9 kg)   SpO2 100%   BMI 31.94 kg/m²      Wt Readings from Last 3 Encounters:   08/06/21 248 lb 12.8 oz (112.9 kg)   03/19/21 259 lb 9.6 oz (117.8 kg)   06/05/20 256 lb (116.1 kg)         Exam:   Physical Exam  Vitals and nursing note reviewed. Constitutional:       Appearance: Normal appearance. He is obese. Comments: Morbidly   HENT:      Head: Normocephalic and atraumatic.       Right Ear: External ear normal.      Left Ear: External ear normal.   Eyes:      Extraocular Movements: Extraocular movements intact. Conjunctiva/sclera: Conjunctivae normal.   Neck:      Vascular: No carotid bruit. Cardiovascular:      Rate and Rhythm: Normal rate and regular rhythm. Pulses: Normal pulses. Heart sounds: Normal heart sounds. Comments: No edema noted  Pulmonary:      Effort: Pulmonary effort is normal. No respiratory distress. Breath sounds: Normal breath sounds. No wheezing. Abdominal:      General: Abdomen is flat. Palpations: Abdomen is soft. Musculoskeletal:         General: Normal range of motion. Cervical back: Normal range of motion and neck supple. Comments: Gait stable   Skin:     General: Skin is warm and dry. Neurological:      General: No focal deficit present. Mental Status: He is alert and oriented to person, place, and time. Psychiatric:         Mood and Affect: Mood normal.         Behavior: Behavior normal.         Thought Content: Thought content normal.         Review of Data:  Labs reviewed: n/a      Impression:  Patient Active Problem List   Diagnosis Code    Primary hypertension I10    Hyperlipidemia LDL goal <130 E78.5    History of gout Z87.39    Obesity (BMI 30-39. 9) E66.9    Strain of back S39.012A       Plan:    ICD-10-CM ICD-9-CM    1. Primary hypertension  I10 401.9 lisinopril-hydroCHLOROthiazide (PRINZIDE, ZESTORETIC) 20-12.5 mg per tablet      amLODIPine (NORVASC) 10 mg tablet      METABOLIC PANEL, COMPREHENSIVE   2. Mixed hyperlipidemia  E78.2 272.2 atorvastatin (LIPITOR) 20 mg tablet      LIPID PANEL   3. Acute idiopathic gout, unspecified site  M10.00 274.01 colchicine (MITIGARE) 0.6 mg capsule   4. BMI 31.0-31.9,adult  Z68.31 V85.31    5. Need for hepatitis C screening test  Z11.59 V73.89 HEPATITIS PANEL, ACUTE   6. COVID-19 vaccination declined  Z28.21 V64.06      -Primary hypertension.     BP well controlled under current therapy  Continue amlodipine 10 mg daily  Continue Prinzide 20/12.5 mg daily    -Mixed [No Acute Distress] : no acute distress [Well Nourished] : well nourished hyperlipidemia  Reviewed February labs with patient  Much education was given about the severity of his numbers  Unable to assess lipid levels today as patient refused to get labs done prior to today's appointment-lack of insurance and unaware he had hugo. Goal is <150. Instructed pt on low-fat diet, avoidance of refined carbohydrates and alcohol. Need to limit red meat and animal oils. Increase fiber with vegetables, whole grains, fruit. Instructed patient to initiate cardio exercise to help burn calories and reduce weight. These instructions were not very appealing to the patient.    -Gout  DC allopurinol due to side effects  Continue colchicine as needed    -BMI 31.94  Patient has lost 11 pounds in 5 months.   Initiate cardio exercise to burn more calories to drop more weight  Avoid unhealthy foods like excessive amount of carbs and sweets    -  Patient declines COVID-19 vaccination  Placed order for hepatitis C screening at his physical in February 2022        Follow up 6 months  Labs needed 1 week prior to appt: Yes  Lipid, CMP, Hep C    Dr. Chavez Rangel, AGNP-C, DNP  [facility] [address] [place] [address] [Well Developed] : well developed [Well-Appearing] : well-appearing [Normal Sclera/Conjunctiva] : normal sclera/conjunctiva [PERRL] : pupils equal round and reactive to light [EOMI] : extraocular movements intact [No JVD] : no jugular venous distention [Supple] : supple [No Respiratory Distress] : no respiratory distress  [Clear to Auscultation] : lungs were clear to auscultation bilaterally [Normal Rate] : normal rate  [Regular Rhythm] : with a regular rhythm [Normal S1, S2] : normal S1 and S2 [No Carotid Bruits] : no carotid bruits [Pedal Pulses Present] : the pedal pulses are present [No Edema] : there was no peripheral edema [Soft] : abdomen soft [Non Tender] : non-tender [No HSM] : no HSM [Normal Axillary Nodes] : no axillary lymphadenopathy [Normal Posterior Cervical Nodes] : no posterior cervical lymphadenopathy [Normal Anterior Cervical Nodes] : no anterior cervical lymphadenopathy [No CVA Tenderness] : no CVA  tenderness [No Spinal Tenderness] : no spinal tenderness [No Joint Swelling] : no joint swelling [Grossly Normal Strength/Tone] : grossly normal strength/tone [No Rash] : no rash [Coordination Grossly Intact] : coordination grossly intact [Normal Gait] : normal gait [Speech Grossly Normal] : speech grossly normal [Memory Grossly Normal] : memory grossly normal [Alert and Oriented x3] : oriented to person, place, and time [de-identified] : Flat affect with deliberate speech [de-identified] : (-)SLR-bilaterally [de-identified] : (-)SI joint tenderness bilaterally

## 2021-08-06 NOTE — PROGRESS NOTES
Chief Complaint   Patient presents with    Hypertension    Cholesterol Problem       1. Have you been to the ER, urgent care clinic since your last visit? Hospitalized since your last visit? No    2. Have you seen or consulted any other health care providers outside of the [address] [place] [address] since your last visit? Include any pap smears or colon screening.  No

## 2021-08-06 NOTE — TELEPHONE ENCOUNTER
Pt called said he forgot to tell Dr Elise Pelletier he needed his wanted to change his pharmacy he would like his prescriptions to now go to [facility].

## 2021-10-04 DIAGNOSIS — E78.2 MIXED HYPERLIPIDEMIA: ICD-10-CM

## 2021-10-04 DIAGNOSIS — I10 PRIMARY HYPERTENSION: ICD-10-CM

## 2021-10-04 RX ORDER — ATORVASTATIN CALCIUM 20 MG/1
TABLET, FILM COATED ORAL
Qty: 90 TABLET | Refills: 1 | Status: SHIPPED
Start: 2021-10-04 | End: 2022-02-07 | Stop reason: DRUGHIGH

## 2021-10-04 RX ORDER — LISINOPRIL AND HYDROCHLOROTHIAZIDE 12.5; 2 MG/1; MG/1
TABLET ORAL
Qty: 90 TABLET | Refills: 1 | Status: SHIPPED | OUTPATIENT
Start: 2021-10-04 | End: 2022-02-07 | Stop reason: SDUPTHER

## 2021-10-04 RX ORDER — AMLODIPINE BESYLATE 10 MG/1
TABLET ORAL
Qty: 90 TABLET | Refills: 1 | Status: SHIPPED | OUTPATIENT
Start: 2021-10-04 | End: 2022-02-07 | Stop reason: SDUPTHER

## 2021-10-19 RX ORDER — INDOMETHACIN 50 MG/1
50 CAPSULE ORAL 3 TIMES DAILY
Qty: 90 CAPSULE | Refills: 2 | OUTPATIENT
Start: 2021-10-19

## 2021-10-19 NOTE — TELEPHONE ENCOUNTER
Last Visit: 8/6/21 with LISA Calles  Next Appointment: 2/7/22 with LISA Calles  Previous Refill Encounter(s): 6/5/20 #90 with 2 refills    Requested Prescriptions     Pending Prescriptions Disp Refills    indomethacin (INDOCIN) 50 mg capsule 90 Capsule 2     Sig: Take 1 Capsule by mouth three (3) times daily.

## 2021-10-19 NOTE — TELEPHONE ENCOUNTER
Requested Prescriptions     Refused Prescriptions Disp Refills    indomethacin (INDOCIN) 50 mg capsule 90 Capsule 2     Sig: Take 1 Capsule by mouth three (3) times daily. Refused By: Joseph Elliott     Reason for Refusal: Refill not appropriate     Patient was given colchicine to take as needed for his gout flareup, not Indocin.   Thank you

## 2021-11-10 RX ORDER — INDOMETHACIN 50 MG/1
50 CAPSULE ORAL 3 TIMES DAILY
Qty: 90 CAPSULE | Refills: 2 | OUTPATIENT
Start: 2021-11-10

## 2021-11-10 NOTE — TELEPHONE ENCOUNTER
Patient was given colchicine to take as needed for his gout flareup, not Indocin. Indocin was discontinued May/2020.  Thank you

## 2022-01-19 ENCOUNTER — TELEPHONE (OUTPATIENT)
Dept: INTERNAL MEDICINE CLINIC | Age: 57
End: 2022-01-19

## 2022-01-19 DIAGNOSIS — M10.00 ACUTE IDIOPATHIC GOUT, UNSPECIFIED SITE: ICD-10-CM

## 2022-01-19 NOTE — TELEPHONE ENCOUNTER
----- Message from Dossie Plush sent at 1/19/2022  1:23 PM EST -----  Subject: Message to Provider    QUESTIONS  Information for Provider? Patient is calling in for a refill for Gout   medicine. ... he doesnt knw the name of it but would like if Dr. Raciel Rowland   can give him a call   ---------------------------------------------------------------------------  --------------  [address]  What is the best way for the office to contact you? OK to leave message on   voicemail  Preferred Call Back Phone Number? [phone]  ---------------------------------------------------------------------------  --------------  SCRIPT ANSWERS  Relationship to Patient?  Self

## 2022-01-19 NOTE — TELEPHONE ENCOUNTER
----- Message from Edgar Miranda sent at 1/19/2022  9:38 AM EST -----  Subject: Refill Request    QUESTIONS  Name of Medication? lisinopril-hydroCHLOROthiazide (PRINZIDE, ZESTORETIC)   20-12.5 mg per tablet  Patient-reported dosage and instructions? 1  How many days do you have left? 0  Preferred Pharmacy? [facility] 21 [id number]  Pharmacy phone number (if available)? [phone]  Additional Information for Provider? [facility] 21 [id number] - [address]  ---------------------------------------------------------------------------  --------------  CALL BACK INFO  What is the best way for the office to contact you? OK to leave message on   voicemail  Preferred Call Back Phone Number?  [phone]

## 2022-01-19 NOTE — TELEPHONE ENCOUNTER
Per pharmacy, last filled a 80 d/s on 12/30/21. Has upcoming appt on 2/7/22. He has enough to last until that appt.

## 2022-01-20 NOTE — TELEPHONE ENCOUNTER
Patient reached and states he has been having a gout flare up in his left hand/wrist. Patient states last medication sent in for this was allopurinol and it caused him joint pain all over body. Patient requesting alternative be sent in. Patient states he never received the colchicine sent to [facility] on 8/6/2021, pt says he uses [facility] on [address]. Please advise.

## 2022-01-21 RX ORDER — COLCHICINE 0.6 MG/1
0.6 CAPSULE ORAL
Qty: 30 CAPSULE | Refills: 0 | Status: SHIPPED
Start: 2022-01-21 | End: 2022-02-07

## 2022-01-31 ENCOUNTER — HOSPITAL ENCOUNTER (OUTPATIENT)
Dept: LAB | Age: 57
Discharge: HOME | End: 2022-01-31

## 2022-01-31 ENCOUNTER — APPOINTMENT (OUTPATIENT)
Dept: INTERNAL MEDICINE CLINIC | Age: 57
End: 2022-01-31

## 2022-01-31 DIAGNOSIS — E78.2 MIXED HYPERLIPIDEMIA: ICD-10-CM

## 2022-01-31 DIAGNOSIS — I10 PRIMARY HYPERTENSION: ICD-10-CM

## 2022-01-31 DIAGNOSIS — Z11.59 NEED FOR HEPATITIS C SCREENING TEST: ICD-10-CM

## 2022-01-31 LAB
ALBUMIN SERPL-MCNC: 4.1 G/DL (ref 3.4–5)
ALBUMIN/GLOB SERPL: 1.2 {RATIO} (ref 0.8–1.7)
ALP SERPL-CCNC: 65 U/L (ref 45–117)
ALT SERPL-CCNC: 35 U/L (ref 16–61)
ANION GAP SERPL CALC-SCNC: 6 MMOL/L (ref 3–18)
AST SERPL-CCNC: 19 U/L (ref 10–38)
BILIRUB SERPL-MCNC: 0.5 MG/DL (ref 0.2–1)
BUN SERPL-MCNC: 12 MG/DL (ref 7–18)
BUN/CREAT SERPL: 12 (ref 12–20)
CALCIUM SERPL-MCNC: 9.1 MG/DL (ref 8.5–10.1)
CHLORIDE SERPL-SCNC: 105 MMOL/L (ref 100–111)
CHOLEST SERPL-MCNC: 175 MG/DL
CO2 SERPL-SCNC: 29 MMOL/L (ref 21–32)
CREAT SERPL-MCNC: 1 MG/DL (ref 0.6–1.3)
GLOBULIN SER CALC-MCNC: 3.3 G/DL (ref 2–4)
GLUCOSE SERPL-MCNC: 104 MG/DL (ref 74–99)
HDLC SERPL-MCNC: 42 MG/DL (ref 40–60)
HDLC SERPL: 4.2 {RATIO} (ref 0–5)
LDLC SERPL CALC-MCNC: ABNORMAL MG/DL (ref 0–100)
LIPID PROFILE,FLP: ABNORMAL
POTASSIUM SERPL-SCNC: 4.1 MMOL/L (ref 3.5–5.5)
PROT SERPL-MCNC: 7.4 G/DL (ref 6.4–8.2)
SODIUM SERPL-SCNC: 140 MMOL/L (ref 136–145)
TRIGL SERPL-MCNC: 408 MG/DL (ref ?–150)
VLDLC SERPL CALC-MCNC: ABNORMAL MG/DL

## 2022-01-31 PROCEDURE — 80061 LIPID PANEL: CPT

## 2022-01-31 PROCEDURE — 80053 COMPREHEN METABOLIC PANEL: CPT

## 2022-01-31 PROCEDURE — 36415 COLL VENOUS BLD VENIPUNCTURE: CPT

## 2022-01-31 PROCEDURE — 80074 ACUTE HEPATITIS PANEL: CPT

## 2022-02-01 LAB
HAV IGM SER QL: NEGATIVE
HBV CORE IGM SER QL: NEGATIVE
HBV SURFACE AG SER QL: <0.1 INDEX
HBV SURFACE AG SER QL: NEGATIVE
HCV AB SER IA-ACNC: 0.04 INDEX
HCV AB SERPL QL IA: NEGATIVE
HCV COMMENT,HCGAC: NORMAL
SP1: NORMAL
SP2: NORMAL
SP3: NORMAL

## 2022-02-07 ENCOUNTER — OFFICE VISIT (OUTPATIENT)
Dept: INTERNAL MEDICINE CLINIC | Age: 57
End: 2022-02-07

## 2022-02-07 VITALS
HEART RATE: 67 BPM | RESPIRATION RATE: 18 BRPM | WEIGHT: 255.8 LBS | DIASTOLIC BLOOD PRESSURE: 89 MMHG | TEMPERATURE: 97.6 F | BODY MASS INDEX: 32.83 KG/M2 | SYSTOLIC BLOOD PRESSURE: 145 MMHG | OXYGEN SATURATION: 97 % | HEIGHT: 74 IN

## 2022-02-07 DIAGNOSIS — I10 PRIMARY HYPERTENSION: Primary | ICD-10-CM

## 2022-02-07 DIAGNOSIS — E66.9 OBESITY (BMI 30-39.9): ICD-10-CM

## 2022-02-07 DIAGNOSIS — E78.2 MIXED HYPERLIPIDEMIA: ICD-10-CM

## 2022-02-07 DIAGNOSIS — M10.00 ACUTE IDIOPATHIC GOUT, UNSPECIFIED SITE: ICD-10-CM

## 2022-02-07 DIAGNOSIS — Z78.9 ALCOHOL CONSUMPTION HEAVY: ICD-10-CM

## 2022-02-07 DIAGNOSIS — Z28.21 COVID-19 VACCINATION DECLINED: ICD-10-CM

## 2022-02-07 DIAGNOSIS — F17.200 CURRENT SMOKER: ICD-10-CM

## 2022-02-07 PROCEDURE — 99214 OFFICE O/P EST MOD 30 MIN: CPT | Performed by: NURSE PRACTITIONER

## 2022-02-07 RX ORDER — LISINOPRIL 10 MG/1
10 TABLET ORAL DAILY
Qty: 90 TABLET | Refills: 1 | Status: SHIPPED | OUTPATIENT
Start: 2022-02-07 | End: 2022-07-29 | Stop reason: SDUPTHER

## 2022-02-07 RX ORDER — ATORVASTATIN CALCIUM 40 MG/1
40 TABLET, FILM COATED ORAL DAILY
Qty: 90 TABLET | Refills: 1 | Status: SHIPPED | OUTPATIENT
Start: 2022-02-07 | End: 2022-07-29 | Stop reason: SDUPTHER

## 2022-02-07 RX ORDER — LISINOPRIL AND HYDROCHLOROTHIAZIDE 12.5; 2 MG/1; MG/1
1 TABLET ORAL DAILY
Qty: 90 TABLET | Refills: 1 | Status: SHIPPED | OUTPATIENT
Start: 2022-02-07 | End: 2022-07-29 | Stop reason: SDUPTHER

## 2022-02-07 RX ORDER — AMLODIPINE BESYLATE 10 MG/1
10 TABLET ORAL DAILY
Qty: 90 TABLET | Refills: 1 | Status: SHIPPED | OUTPATIENT
Start: 2022-02-07 | End: 2022-07-29 | Stop reason: SDUPTHER

## 2022-02-07 RX ORDER — INDOMETHACIN 50 MG/1
50 CAPSULE ORAL 3 TIMES DAILY
Qty: 90 CAPSULE | Refills: 2 | Status: SHIPPED | OUTPATIENT
Start: 2022-02-07 | End: 2022-05-08

## 2022-02-07 NOTE — PROGRESS NOTES
Joana Peters presents today for No chief complaint on file. CPE   1/31/2022     1. \"Have you been to the ER, urgent care clinic since your last visit? Hospitalized since your last visit? \" no    2. \"Have you seen or consulted any other health care providers outside of the [address] [place] [address] since your last visit? \" no     3. For patients aged 39-70: Has the patient had a colonoscopy / FIT/ Cologuard? Yes - Care Gap present. OVERDUE! If the patient is female:    4. For patients aged 41-77: Has the patient had a mammogram within the past 2 years? NA - based on age or sex  See top three    5. For patients aged 21-65: Has the patient had a pap smear?  NA - based on age or sex

## 2022-02-07 NOTE — PROGRESS NOTES
[facility] [address]   [place], [address]  [phone] [facility]/[phone] fax    2/7/2022    Wilber Keita III 1965 is a pleasant WHITE/NON- male. He is . 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 Past Medical History:   Diagnosis Date    Hypertension      Past Surgical History:   Procedure Laterality Date    HX HEENT      HX ORTHOPAEDIC       Current Outpatient Medications   Medication Sig    colchicine (MITIGARE) 0.6 mg capsule Take 1 Capsule by mouth daily as needed (gout flare). Indications: treatment to prevent acute gout attack    lisinopril-hydroCHLOROthiazide (PRINZIDE, ZESTORETIC) 20-12.5 mg per tablet TAKE ONE TABLET BY MOUTH DAILY FOR BLOOD PRESSURE    atorvastatin (LIPITOR) 20 mg tablet TAKE ONE TABLET BY MOUTH DAILY    amLODIPine (NORVASC) 10 mg tablet TAKE ONE TABLET BY MOUTH DAILY    clotrimazole-betamethasone (LOTRISONE) topical cream Apply a thin layer to rash twice a day    acetaminophen (TYLENOL) 325 mg tablet Take 3 Tabs by mouth every six (6) hours as needed for Pain (headache). No current facility-administered medications for this visit. Allergies and Intolerances: Allergies   Allergen Reactions    Amoxicillin Anaphylaxis    Allopurinol Other (comments)     Joint pain    Pcn [Penicillins] Anaphylaxis     As a child     Family History:   Family History   Problem Relation Age of Onset    Heart Disease Other         grandfather     Social History:   He  reports that he has been smoking. He has been smoking about 0.25 packs per day. He has never used smokeless tobacco.   Social History     Substance and Sexual Activity   Alcohol Use Yes    Alcohol/week: 4.0 standard drinks    Types: 4 Cans of beer per week    Comment: a case of beer per week     Immunization History:  Immunization History   Administered Date(s) Administered    Td, Adsorbed 05/09/2020       Todays concerns:  1. Gout flare.   He usually has gout flareups in his left wrist and at times in his right foot. Colchicine is not effective. Allopurinol causes joint pain he is seeking ED prescription for Indocin as this has been effective for him in the past.  2.  Alcohol. Can consume a 12 pack of beer in a day but this is not every day. Few days a week and on the weekends. He is not seeking to reduce the amount of alcohol he consumes. Review of Systems:   As above included in HPI. Otherwise 11 point review of systems negative including constitutional, skin, HENT, eyes, respiratory, cardiovascular, gastrointestinal, genitourinary, musculoskeletal, endocrine, hematologic, allergy, and neurologic. Review of Data:  Hepatitis negative  -408   2 on calculable due to elevated TG  CMP good    Physical:   Visit Vitals  BP (!) 145/89   Pulse 67   Temp 97.6 °F (36.4 °C) (Temporal)   Resp 18   Ht 6' 2\" (1.88 m)   Wt 255 lb 12.8 oz (116 kg)   SpO2 97%   BMI 32.84 kg/m²      Wt Readings from Last 3 Encounters:   02/07/22 255 lb 12.8 oz (116 kg)   08/06/21 248 lb 12.8 oz (112.9 kg)   03/19/21 259 lb 9.6 oz (117.8 kg)       Exam:   Physical Exam  Constitutional:       Appearance: Normal appearance. He is obese. HENT:      Head: Normocephalic and atraumatic. Right Ear: Tympanic membrane, ear canal and external ear normal.      Left Ear: Tympanic membrane, ear canal and external ear normal.   Eyes:      Extraocular Movements: Extraocular movements intact. Conjunctiva/sclera: Conjunctivae normal.   Neck:      Vascular: No carotid bruit. Cardiovascular:      Rate and Rhythm: Normal rate and regular rhythm. Pulses: Normal pulses. Heart sounds: Normal heart sounds. Comments: No edema noted to joey LEs  Pulmonary:      Effort: Pulmonary effort is normal. No respiratory distress. Breath sounds: Normal breath sounds. No wheezing. Abdominal:      General: Abdomen is flat. Bowel sounds are normal. There is no distension. Palpations: Abdomen is soft. Tenderness: There is no abdominal tenderness. Musculoskeletal:         General: Normal range of motion. Cervical back: Normal range of motion. Comments: No limitations noted   Skin:     General: Skin is warm and dry. Neurological:      General: No focal deficit present. Mental Status: He is alert and oriented to person, place, and time. Psychiatric:         Mood and Affect: Mood normal.         Behavior: Behavior normal.        Body mass index is 32.84 kg/m². Plan:      ICD-10-CM ICD-9-CM    1. Primary hypertension  I10 401.9 lisinopriL (PRINIVIL, ZESTRIL) 10 mg tablet      lisinopril-hydroCHLOROthiazide (PRINZIDE, ZESTORETIC) 20-12.5 mg per tablet      amLODIPine (NORVASC) 10 mg tablet   2. Mixed hyperlipidemia  E78.2 272.2 atorvastatin (LIPITOR) 40 mg tablet   3. Acute idiopathic gout, unspecified site  M10.00 274.01 indomethacin (INDOCIN) 50 mg capsule   4. Alcohol consumption heavy  Z78.9 V69.8    5. COVID-19 vaccination declined  Z28.21 V64.06    6. Current smoker  F17.200 305.1      -Primary hypertension  continue, Prinzide 20/12.5 daily, amlodipine 10 mg daily. Initiate lisinopril 10 mg daily   Explained to pt he will take a total of 30mg lisinopril along with 12.5mg HCTZ. Unable to increase HCTZ d/t gout flares. Limit sodium in diet to 2g/d  Avoid pork  Initiate cardio exercise  Weight reduction  Increase water intake  Check BP and report readings greater than 139/89 to office    -Mixed hyperlipidemia  -408   2 on calculable due to elevated TG  continue, Increase atorvastatin to 40 mg. He is to take 20 milligrams 2 tabs till all gone then start atorvastatin 40 mg daily. Reduce fried fatty or oily foods in diet  Limit red meats, processed foods, and alcohol.     Limit fast food  Work on weight reduction  Increase water intake    -Acute idiopathic gout  DC colchicine due to not effective  Initiate Indocin  Have cautioned patient on the use of NSAIDs and the need to watch for elevations in BP, ankle edema, SOB or wheezing, blood-tinged vomiting, blood in stool or allergic reaction. If these symptoms occur, patient is to stop the medication immediately and contact the office. Encourage patient to eat prior to taking this medication to help protect the gut.      -Alcohol consumption heavy  Discussed excessive alcohol can increase triglycerides, cause gout flare, and increase blood pressure. Strongly encourage patient to reduce his alcohol intake. -COVID-19 vaccination declined    -Current smoker  Encourage smoking cessation    -Obesity  9 pound weight gain in 6 months  Encouraged exercise and proper diet  Educated alcohol can lead to weight gain due to sugar content  Reviewed medication and completed medication reconciliation with the patient. Reviewed side effects of medications with the patient. Questions were answered and patient verb understanding. Follow up 6 months with labs (CMP, lipid)      Dr. Farrukh Mayes, SANJAY-YEE, DNP  [facility]     The total time 30 minutes. At least 50% of that time was spent in counseling and/or coordination of care. My summary of patient counseling and coordination of care includes reviewing medical record, assessing patient, placing orders, and discussing plan of care with patient.

## 2022-02-09 PROBLEM — Z78.9 ALCOHOL CONSUMPTION HEAVY: Status: ACTIVE | Noted: 2022-02-09

## 2022-03-19 PROBLEM — Z87.39 HISTORY OF GOUT: Status: ACTIVE | Noted: 2017-05-26

## 2022-03-19 PROBLEM — Z78.9 ALCOHOL CONSUMPTION HEAVY: Status: ACTIVE | Noted: 2022-02-09

## 2022-03-19 PROBLEM — E66.9 OBESITY (BMI 30-39.9): Status: ACTIVE | Noted: 2018-07-20

## 2022-05-05 RX ORDER — CLOTRIMAZOLE AND BETAMETHASONE DIPROPIONATE 10; .64 MG/G; MG/G
CREAM TOPICAL
Qty: 15 G | Refills: 0 | Status: SHIPPED | OUTPATIENT
Start: 2022-05-05 | End: 2022-07-29 | Stop reason: SDUPTHER

## 2022-06-21 DIAGNOSIS — M10.00 IDIOPATHIC GOUT, UNSPECIFIED CHRONICITY, UNSPECIFIED SITE: ICD-10-CM

## 2022-06-21 RX ORDER — ALLOPURINOL 100 MG/1
TABLET ORAL
Qty: 90 TABLET | OUTPATIENT
Start: 2022-06-21

## 2022-06-22 NOTE — TELEPHONE ENCOUNTER
Pt dc'd allopurinol back Aug 2021 due to side effects of generalized joint pain. Please inquire with patient if he is having a gout flare.  Thank you

## 2022-06-22 NOTE — TELEPHONE ENCOUNTER
Patient reached and he states he did stop taking this but he has recently changed his diet and started taking the allopurinol daily for a few weeks and he hasnt had any issues with his any joint pain so he wants to start taking it daily again. Requesting it be sent to [facility].

## 2022-06-23 RX ORDER — ALLOPURINOL 100 MG/1
100 TABLET ORAL DAILY
Qty: 60 TABLET | Refills: 0 | Status: SHIPPED
Start: 2022-06-23 | End: 2022-07-29

## 2022-07-22 ENCOUNTER — APPOINTMENT (OUTPATIENT)
Dept: INTERNAL MEDICINE CLINIC | Age: 57
End: 2022-07-22

## 2022-07-22 ENCOUNTER — HOSPITAL ENCOUNTER (OUTPATIENT)
Dept: LAB | Age: 57
Discharge: HOME | End: 2022-07-22

## 2022-07-22 DIAGNOSIS — I10 PRIMARY HYPERTENSION: ICD-10-CM

## 2022-07-22 DIAGNOSIS — E78.2 MIXED HYPERLIPIDEMIA: ICD-10-CM

## 2022-07-22 LAB
ALBUMIN SERPL-MCNC: 4.1 G/DL (ref 3.4–5)
ALBUMIN/GLOB SERPL: 1.3 {RATIO} (ref 0.8–1.7)
ALP SERPL-CCNC: 71 U/L (ref 45–117)
ALT SERPL-CCNC: 38 U/L (ref 16–61)
ANION GAP SERPL CALC-SCNC: 6 MMOL/L (ref 3–18)
AST SERPL-CCNC: 19 U/L (ref 10–38)
BILIRUB SERPL-MCNC: 0.6 MG/DL (ref 0.2–1)
BUN SERPL-MCNC: 15 MG/DL (ref 7–18)
BUN/CREAT SERPL: 13 (ref 12–20)
CALCIUM SERPL-MCNC: 9 MG/DL (ref 8.5–10.1)
CHLORIDE SERPL-SCNC: 105 MMOL/L (ref 100–111)
CHOLEST SERPL-MCNC: 155 MG/DL
CO2 SERPL-SCNC: 28 MMOL/L (ref 21–32)
CREAT SERPL-MCNC: 1.13 MG/DL (ref 0.6–1.3)
GLOBULIN SER CALC-MCNC: 3.2 G/DL (ref 2–4)
GLUCOSE SERPL-MCNC: 108 MG/DL (ref 74–99)
HDLC SERPL-MCNC: 43 MG/DL (ref 40–60)
HDLC SERPL: 3.6 {RATIO} (ref 0–5)
LDLC SERPL CALC-MCNC: 53.8 MG/DL (ref 0–100)
LIPID PROFILE,FLP: ABNORMAL
POTASSIUM SERPL-SCNC: 4.3 MMOL/L (ref 3.5–5.5)
PROT SERPL-MCNC: 7.3 G/DL (ref 6.4–8.2)
SODIUM SERPL-SCNC: 139 MMOL/L (ref 136–145)
TRIGL SERPL-MCNC: 291 MG/DL (ref ?–150)
VLDLC SERPL CALC-MCNC: 58.2 MG/DL

## 2022-07-22 PROCEDURE — 80061 LIPID PANEL: CPT

## 2022-07-22 PROCEDURE — 80053 COMPREHEN METABOLIC PANEL: CPT

## 2022-07-22 PROCEDURE — 36415 COLL VENOUS BLD VENIPUNCTURE: CPT

## 2022-07-29 ENCOUNTER — OFFICE VISIT (OUTPATIENT)
Dept: INTERNAL MEDICINE CLINIC | Age: 57
End: 2022-07-29

## 2022-07-29 VITALS
OXYGEN SATURATION: 100 % | TEMPERATURE: 97.4 F | HEART RATE: 72 BPM | HEIGHT: 74 IN | RESPIRATION RATE: 18 BRPM | DIASTOLIC BLOOD PRESSURE: 74 MMHG | SYSTOLIC BLOOD PRESSURE: 114 MMHG | BODY MASS INDEX: 31.16 KG/M2 | WEIGHT: 242.8 LBS

## 2022-07-29 DIAGNOSIS — Z53.20 COLON CANCER SCREENING DECLINED: ICD-10-CM

## 2022-07-29 DIAGNOSIS — I10 PRIMARY HYPERTENSION: Primary | ICD-10-CM

## 2022-07-29 DIAGNOSIS — E78.2 MIXED HYPERLIPIDEMIA: ICD-10-CM

## 2022-07-29 DIAGNOSIS — L55.0 SUNBURN OF FIRST DEGREE: ICD-10-CM

## 2022-07-29 DIAGNOSIS — M62.838 MUSCLE SPASMS OF NECK: ICD-10-CM

## 2022-07-29 PROCEDURE — 99214 OFFICE O/P EST MOD 30 MIN: CPT | Performed by: NURSE PRACTITIONER

## 2022-07-29 RX ORDER — CYCLOBENZAPRINE HCL 10 MG
10 TABLET ORAL
Qty: 30 TABLET | Refills: 0 | Status: SHIPPED
Start: 2022-07-29 | End: 2022-07-29

## 2022-07-29 RX ORDER — ATORVASTATIN CALCIUM 40 MG/1
40 TABLET, FILM COATED ORAL DAILY
Qty: 90 TABLET | Refills: 1 | Status: SHIPPED | OUTPATIENT
Start: 2022-07-29

## 2022-07-29 RX ORDER — LISINOPRIL 10 MG/1
10 TABLET ORAL DAILY
Qty: 90 TABLET | Refills: 1 | Status: SHIPPED | OUTPATIENT
Start: 2022-07-29

## 2022-07-29 RX ORDER — MENTHOL 40 MG/ML
GEL TOPICAL
Qty: 1 EACH | Refills: 0
Start: 2022-07-29

## 2022-07-29 RX ORDER — LISINOPRIL AND HYDROCHLOROTHIAZIDE 12.5; 2 MG/1; MG/1
1 TABLET ORAL DAILY
Qty: 90 TABLET | Refills: 1 | Status: SHIPPED | OUTPATIENT
Start: 2022-07-29

## 2022-07-29 RX ORDER — CLOTRIMAZOLE AND BETAMETHASONE DIPROPIONATE 10; .64 MG/G; MG/G
CREAM TOPICAL
Qty: 15 G | Refills: 1 | Status: SHIPPED | OUTPATIENT
Start: 2022-07-29

## 2022-07-29 RX ORDER — AMLODIPINE BESYLATE 10 MG/1
10 TABLET ORAL DAILY
Qty: 90 TABLET | Refills: 1 | Status: SHIPPED | OUTPATIENT
Start: 2022-07-29

## 2022-07-29 RX ORDER — CYCLOBENZAPRINE HCL 10 MG
10 TABLET ORAL
Qty: 30 TABLET | Refills: 0 | Status: SHIPPED | OUTPATIENT
Start: 2022-07-29

## 2022-07-29 NOTE — PROGRESS NOTES
Chief Complaint   Patient presents with    Hypertension     6 month f/u    Thyroid Problem    Labs     Completed 7/22/2022     1. \"Have you been to the ER, urgent care clinic since your last visit? Hospitalized since your last visit? \" No    2. \"Have you seen or consulted any other health care providers outside of the [address] [place] [address] since your last visit? \" No     3. For patients aged 39-70: Has the patient had a colonoscopy / FIT/ Cologuard? No      If the patient is female:    4. For patients aged 41-77: Has the patient had a mammogram within the past 2 years? NA - based on age or sex      11. For patients aged 21-65: Has the patient had a pap smear?  NA - based on age or sex

## 2022-07-29 NOTE — PROGRESS NOTES
[facility] [id number] [place] [address] [place], [address] [place] [address]  [phone] [facility]/[phone] fax    7/29/2022    Myranda Choi III 1965 is a pleasant WHITE/NON- male. 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 . Past Medical History:   Diagnosis Date    Hypertension      Past Surgical History:   Procedure Laterality Date    HX HEENT      HX ORTHOPAEDIC       Current Outpatient Medications   Medication Sig    clotrimazole-betamethasone (LOTRISONE) topical cream APPLY A THIN LAYER TO RASH TWICE A DAY    atorvastatin (LIPITOR) 40 mg tablet Take 1 Tablet by mouth daily. lisinopriL (PRINIVIL, ZESTRIL) 10 mg tablet Take 1 Tablet by mouth daily. lisinopril-hydroCHLOROthiazide (PRINZIDE, ZESTORETIC) 20-12.5 mg per tablet Take 1 Tablet by mouth daily. amLODIPine (NORVASC) 10 mg tablet Take 1 Tablet by mouth daily. Indications: high blood pressure    acetaminophen (TYLENOL) 325 mg tablet Take 3 Tabs by mouth every six (6) hours as needed for Pain (headache). allopurinoL (ZYLOPRIM) 100 mg tablet Take 1 Tablet by mouth daily. Indications: treatment to prevent acute gout attack (Patient not taking: Reported on 7/29/2022)     No current facility-administered medications for this visit. Allergies and Intolerances: Allergies   Allergen Reactions    Amoxicillin Anaphylaxis    Allopurinol Other (comments)     Joint pain - pt reports he can tolerate this medication, not an allergy 6/22/2022    Pcn [Penicillins] Anaphylaxis     As a child     Family History:   Family History   Problem Relation Age of Onset    Heart Disease Other         grandfather     Social History:   He  reports that he has been smoking cigarettes. He has been smoking an average of .25 packs per day.  He has never used smokeless tobacco.   Social History     Substance and Sexual Activity   Alcohol Use Yes    Alcohol/week: 4.0 standard drinks    Types: 4 Cans of beer per week    Comment: a case of beer per week Immunization History:  Immunization History   Administered Date(s) Administered    Td, Adsorbed 2020       Todays concerns/HPI:  Chronic shoulder pain. Burning sensation from lt side of neck to shoulder. Occurs daily. Starts around lunch time. Lt hand goes numb.  and uses LUE for steering. Using Flexeril 10mg nightly-effective but med . Weight. Trying to be more active. Alcohol. Continues to consume beer. Has reduced the amount some. Wishes to hold off on colonoscopy for now. Review of Systems:  Pertinent items are noted in HPI. Review of Data:  CMP good  -291; LDL 53    Physical:   Visit Vitals  /74   Pulse 72   Temp 97.4 °F (36.3 °C) (Temporal)   Resp 18   Ht 6' 2\" (1.88 m)   Wt 242 lb 12.8 oz (110.1 kg)   SpO2 100%   BMI 31.17 kg/m²      Wt Readings from Last 3 Encounters:   22 242 lb 12.8 oz (110.1 kg)   22 255 lb 12.8 oz (116 kg)   21 248 lb 12.8 oz (112.9 kg)       Exam:   Physical Exam  Constitutional:       Appearance: Normal appearance. He is obese. HENT:      Head: Normocephalic and atraumatic. Right Ear: External ear normal.      Left Ear: External ear normal.   Eyes:      Extraocular Movements: Extraocular movements intact. Neck:      Vascular: No carotid bruit. Cardiovascular:      Rate and Rhythm: Normal rate and regular rhythm. Pulses: Normal pulses. Heart sounds: Normal heart sounds. Pulmonary:      Effort: Pulmonary effort is normal. No respiratory distress. Breath sounds: Normal breath sounds. No wheezing. Musculoskeletal:         General: Normal range of motion. Cervical back: Normal range of motion. Comments: No limitations noted to Left shoulder/neck   Skin:     General: Skin is warm and dry. Comments: Sunburn to face and arms   Neurological:      General: No focal deficit present. Mental Status: He is alert and oriented to person, place, and time. Psychiatric:         Mood and Affect: Mood normal.         Behavior: Behavior normal.      Body mass index is 31.17 kg/m². Plan:    1. Primary hypertension  Well controlled    - amLODIPine (NORVASC) 10 mg tablet; Take 1 Tablet by mouth in the morning. Indications: high blood pressure  Dispense: 90 Tablet; Refill: 1  - lisinopriL (PRINIVIL, ZESTRIL) 10 mg tablet; Take 1 Tablet by mouth in the morning. Dispense: 90 Tablet; Refill: 1  - lisinopril-hydroCHLOROthiazide (PRINZIDE, ZESTORETIC) 20-12.5 mg per tablet; Take 1 Tablet by mouth in the morning. Dispense: 90 Tablet; Refill: 1    2. Mixed hyperlipidemia  Not controlled  -291; LDL 53  TG remains elevated, most likely related to alcohol consumption    - atorvastatin (LIPITOR) 40 mg tablet; Take 1 Tablet by mouth in the morning. Dispense: 90 Tablet; Refill: 1    3. Muscle spasms of neck  Instructed pt to try medication at bedtime before taking during waking hours to experiment and see if med will make them drowsy. If med makes them drowsy then pt is to avoid driving, avoid operating heavy machinery, or any other activity that requires alertness. Patient verbalized understanding.    - menthol (Biofreeze, menthol,) 4 % gel; Apply to affected area  Indications: sprains and strains  Dispense: 1 Each; Refill: 0  - cyclobenzaprine (FLEXERIL) 10 mg tablet; Take 1 Tablet by mouth nightly as needed for Muscle Spasm(s) (Muscle spasms). Indications: muscle spasm  Dispense: 30 Tablet; Refill: 0    4. Sunburn of first degree  Encouraged use of 100 SPF and wide rimed hats     5. Colon cancer screening declined    Reviewed medication and completed medication reconciliation with the patient. Reviewed side effects of medications with the patient. Questions were answered and patient verb understanding.       Follow up 6m CPE (Lipid, CMP, PSA, CBC, A1c)      Dr. Leggett Members, AGNP-C, DNP  [facility] [address] [place] [address]

## 2022-10-17 DIAGNOSIS — M10.00 IDIOPATHIC GOUT, UNSPECIFIED CHRONICITY, UNSPECIFIED SITE: ICD-10-CM

## 2022-10-17 RX ORDER — ALLOPURINOL 100 MG/1
TABLET ORAL
Qty: 60 TABLET | Refills: 0 | OUTPATIENT
Start: 2022-10-17

## 2022-10-17 NOTE — TELEPHONE ENCOUNTER
Allopurinol dc'd due to caused joint pain. Colchicine was not effective. He was rx indocin for prn.      Requested Prescriptions     Refused Prescriptions Disp Refills    allopurinoL (ZYLOPRIM) 100 mg tablet [Pharmacy Med Name: ALLOPURINOL 100 MG TABLET] 60 Tablet 0     Sig: TAKE 1 TABLET BY MOUTH DAILY TO PREVENT ACUTE GOUT ATTACK     Refused By: Myranda Logan     Reason for Refusal: Medication Discontinued

## 2023-01-19 ENCOUNTER — HOSPITAL ENCOUNTER (EMERGENCY)
Age: 58
Discharge: HOME | End: 2023-01-19
Attending: EMERGENCY MEDICINE

## 2023-01-19 ENCOUNTER — APPOINTMENT (OUTPATIENT)
Dept: GENERAL RADIOLOGY | Age: 58
End: 2023-01-19
Attending: EMERGENCY MEDICINE

## 2023-01-19 VITALS
RESPIRATION RATE: 16 BRPM | HEART RATE: 73 BPM | WEIGHT: 240 LBS | TEMPERATURE: 98.2 F | DIASTOLIC BLOOD PRESSURE: 87 MMHG | SYSTOLIC BLOOD PRESSURE: 123 MMHG | HEIGHT: 74 IN | OXYGEN SATURATION: 98 % | BODY MASS INDEX: 30.8 KG/M2

## 2023-01-19 DIAGNOSIS — J18.9 COMMUNITY ACQUIRED PNEUMONIA OF LEFT LOWER LOBE OF LUNG: Primary | ICD-10-CM

## 2023-01-19 LAB
COVID-19 RAPID TEST, COVR: NOT DETECTED
DEPRECATED S PYO AG THROAT QL EIA: NEGATIVE
FLUAV AG NPH QL IA: NEGATIVE
FLUBV AG NOSE QL IA: NEGATIVE
SOURCE, COVRS: NORMAL

## 2023-01-19 PROCEDURE — 87880 STREP A ASSAY W/OPTIC: CPT

## 2023-01-19 PROCEDURE — 71046 X-RAY EXAM CHEST 2 VIEWS: CPT

## 2023-01-19 PROCEDURE — 87635 SARS-COV-2 COVID-19 AMP PRB: CPT

## 2023-01-19 PROCEDURE — 99284 EMERGENCY DEPT VISIT MOD MDM: CPT

## 2023-01-19 PROCEDURE — 87070 CULTURE OTHR SPECIMN AEROBIC: CPT

## 2023-01-19 PROCEDURE — 87804 INFLUENZA ASSAY W/OPTIC: CPT

## 2023-01-19 RX ORDER — DOXYCYCLINE HYCLATE 100 MG
100 TABLET ORAL 2 TIMES DAILY
Qty: 14 TABLET | Refills: 0 | Status: SHIPPED | OUTPATIENT
Start: 2023-01-19 | End: 2023-01-26

## 2023-01-19 RX ORDER — ALBUTEROL SULFATE 90 UG/1
2 AEROSOL, METERED RESPIRATORY (INHALATION)
Qty: 18 G | Refills: 0 | Status: SHIPPED | OUTPATIENT
Start: 2023-01-19

## 2023-01-19 NOTE — ED PROVIDER NOTES
The patient is a 59-year-old male with past medical history significant for hypertension, who presents the ED today because he has been sick since Sunday. He states that he has been having bad headaches for the past 3 days. Last night and this morning they got worse. He is also having diarrhea x2 episodes since last Sunday. He has a decreased appetite. He had a fever today of 102 but took Tylenol prior to arrival.  Additionally, he is complaining of body aches and myalgias. Finally, he is complaining of a mild cough, sore throat and swollen glands. He has had a prior episode of strep throat as an adult. He denies any nausea or vomiting at this time. Past Medical History:   Diagnosis Date    Hypertension        Past Surgical History:   Procedure Laterality Date    HX HEENT      HX ORTHOPAEDIC           Family History:   Problem Relation Age of Onset    Heart Disease Other         grandfather       Social History     Socioeconomic History    Marital status:      Spouse name: Not on file    Number of children: Not on file    Years of education: Not on file    Highest education level: Not on file   Occupational History    Not on file   Tobacco Use    Smoking status: Some Days     Packs/day: 0.25     Types: Cigarettes    Smokeless tobacco: Never   Substance and Sexual Activity    Alcohol use:  Yes     Alcohol/week: 4.0 standard drinks     Types: 4 Cans of beer per week     Comment: a case of beer per week    Drug use: Yes     Types: Marijuana    Sexual activity: Yes     Partners: Female   Other Topics Concern    Not on file   Social History Narrative    Not on file     Social Determinants of Health     Financial Resource Strain: Not on file   Food Insecurity: Not on file   Transportation Needs: Not on file   Physical Activity: Not on file   Stress: Not on file   Social Connections: Not on file   Intimate Partner Violence: Not on file   Housing Stability: Not on file         ALLERGIES: Amoxicillin, Allopurinol, and Pcn [penicillins]    Review of Systems   All other systems reviewed and are negative. Vitals:    01/19/23 0921   BP: 123/87   Pulse: 73   Resp: 16   Temp: 98.2 °F (36.8 °C)   SpO2: 98%   Weight: 108.9 kg (240 lb)   Height: 6' 2\" (1.88 m)            Physical Exam  Vitals and nursing note reviewed. Constitutional:       Appearance: Normal appearance. HENT:      Head: Normocephalic and atraumatic. Right Ear: External ear normal.      Left Ear: External ear normal.      Nose: Nose normal.      Mouth/Throat:      Mouth: Mucous membranes are moist.      Pharynx: Oropharynx is clear. Comments: Cobblestoning and erythema of the oropharynx, there are some exudates as well. Eyes:      Extraocular Movements: Extraocular movements intact. Conjunctiva/sclera: Conjunctivae normal.      Pupils: Pupils are equal, round, and reactive to light. Cardiovascular:      Rate and Rhythm: Normal rate and regular rhythm. Pulses: Normal pulses. Heart sounds: Normal heart sounds. Pulmonary:      Effort: Pulmonary effort is normal.      Breath sounds: Normal breath sounds. Abdominal:      General: Abdomen is flat. Bowel sounds are normal.      Palpations: Abdomen is soft. Musculoskeletal:         General: Normal range of motion. Cervical back: Normal range of motion and neck supple. Lymphadenopathy:      Cervical: Cervical adenopathy present. Skin:     General: Skin is warm and dry. Capillary Refill: Capillary refill takes less than 2 seconds. Neurological:      General: No focal deficit present. Mental Status: He is alert and oriented to person, place, and time. Psychiatric:         Mood and Affect: Mood normal.         Behavior: Behavior normal.         Thought Content:  Thought content normal.         Judgment: Judgment normal.      Recent Results (from the past 12 hour(s))   COVID-19 RAPID TEST    Collection Time: 01/19/23  9:25 AM   Result Value Ref Range Specimen source Nasopharyngeal      COVID-19 rapid test Not detected     INFLUENZA A & B AG (RAPID TEST)    Collection Time: 01/19/23  9:25 AM   Result Value Ref Range    Influenza A Antigen Negative NEG      Influenza B Antigen Negative NEG       XR CHEST PA LAT   Final Result   Left perihilar airspace opacity, suggestive of pneumonia in the appropriate   clinical setting. Recommend radiographic follow-up within 6-8 weeks after   appropriate medical therapy and imaging follow-up until clearance. Medical Decision Making  The patient is a 59-year-old male with past medical history significant for hypertension, who presents to the ED today with body aches, headaches, cough, sore throat, diarrhea fevers, and a sore throat since Sunday. His COVID and flu are negative. His chest x-ray shows a left-sided pneumonia. He will be discharged home with a prescription for doxycyline and an albuterol inhaler. He will be advised to follow-up with his primary care physician in 2 to 3 days. Return precautions have been given. Amount and/or Complexity of Data Reviewed  Labs: ordered. Radiology: ordered.            Procedures

## 2023-01-19 NOTE — ED NOTES
Pt swabbed at bedside for covid and flu. Sample collected, labeled and walked to lab. Pt tolerated well.

## 2023-01-20 DIAGNOSIS — I10 PRIMARY HYPERTENSION: Primary | ICD-10-CM

## 2023-01-20 DIAGNOSIS — E78.2 MIXED HYPERLIPIDEMIA: ICD-10-CM

## 2023-01-20 DIAGNOSIS — Z12.5 SCREENING FOR PROSTATE CANCER: ICD-10-CM

## 2023-01-21 LAB
BACTERIA SPEC CULT: NORMAL
SERVICE CMNT-IMP: NORMAL

## 2023-02-23 RX ORDER — LISINOPRIL 10 MG/1
TABLET ORAL
Qty: 60 TABLET | Refills: 0 | Status: SHIPPED | OUTPATIENT
Start: 2023-02-23 | End: 2023-04-13 | Stop reason: SDUPTHER

## 2023-03-10 ENCOUNTER — TELEPHONE (OUTPATIENT)
Age: 58
End: 2023-03-10

## 2023-03-10 NOTE — TELEPHONE ENCOUNTER
----- Message from Miguel Desouza sent at 3/10/2023  9:45 AM EST -----  Subject: Referral Request    Reason for referral request? labs for appt in april, non in [email or web address]   system, did some in the old system   Provider patient wants to be referred to(if known):     Provider Phone Number(if known):     Additional Information for Provider? pt is needing a early AM appt.   ---------------------------------------------------------------------------  --------------  CALL BACK INFO    [phone]; OK to leave message on voicemail  ---------------------------------------------------------------------------  --------------

## 2023-03-14 ENCOUNTER — TELEPHONE (OUTPATIENT)
Age: 58
End: 2023-03-14

## 2023-03-14 NOTE — TELEPHONE ENCOUNTER
----- Message from [facility] sent at 3/14/2023  9:55 AM EDT -----  Subject: Message to Provider    QUESTIONS  Information for Provider? pt is returning a provider call from [facility],   the phone number attached is his wife's mobile  ---------------------------------------------------------------------------  --------------  [id number]  [phone]; OK to leave message on voicemail  ---------------------------------------------------------------------------  --------------  SCRIPT ANSWERS  undefined

## 2023-04-21 ENCOUNTER — HOSPITAL ENCOUNTER (OUTPATIENT)
Facility: HOSPITAL | Age: 58
Setting detail: SPECIMEN
End: 2023-04-21

## 2023-04-21 DIAGNOSIS — E78.2 MIXED HYPERLIPIDEMIA: ICD-10-CM

## 2023-04-21 DIAGNOSIS — I10 PRIMARY HYPERTENSION: ICD-10-CM

## 2023-04-21 LAB
ALBUMIN SERPL-MCNC: 3.9 G/DL (ref 3.4–5)
ALBUMIN/GLOB SERPL: 1.2 (ref 0.8–1.7)
ALP SERPL-CCNC: 62 U/L (ref 45–117)
ALT SERPL-CCNC: 26 U/L (ref 16–61)
ANION GAP SERPL CALC-SCNC: 5 MMOL/L (ref 3–18)
AST SERPL-CCNC: 16 U/L (ref 10–38)
BILIRUB SERPL-MCNC: 0.5 MG/DL (ref 0.2–1)
BUN SERPL-MCNC: 20 MG/DL (ref 7–18)
BUN/CREAT SERPL: 16 (ref 12–20)
CALCIUM SERPL-MCNC: 8.6 MG/DL (ref 8.5–10.1)
CHLORIDE SERPL-SCNC: 108 MMOL/L (ref 100–111)
CHOLEST SERPL-MCNC: 146 MG/DL
CO2 SERPL-SCNC: 24 MMOL/L (ref 21–32)
CREAT SERPL-MCNC: 1.27 MG/DL (ref 0.6–1.3)
GLOBULIN SER CALC-MCNC: 3.2 G/DL (ref 2–4)
GLUCOSE SERPL-MCNC: 94 MG/DL (ref 74–99)
HDLC SERPL-MCNC: 40 MG/DL (ref 40–60)
HDLC SERPL: 3.7 (ref 0–5)
LDLC SERPL CALC-MCNC: 69 MG/DL (ref 0–100)
LIPID PANEL: ABNORMAL
POTASSIUM SERPL-SCNC: 4.5 MMOL/L (ref 3.5–5.5)
PROT SERPL-MCNC: 7.1 G/DL (ref 6.4–8.2)
SODIUM SERPL-SCNC: 137 MMOL/L (ref 136–145)
TRIGL SERPL-MCNC: 185 MG/DL
VLDLC SERPL CALC-MCNC: 37 MG/DL

## 2023-04-21 PROCEDURE — 80061 LIPID PANEL: CPT

## 2023-04-21 PROCEDURE — 36415 COLL VENOUS BLD VENIPUNCTURE: CPT

## 2023-04-21 PROCEDURE — 80053 COMPREHEN METABOLIC PANEL: CPT

## 2023-04-26 DIAGNOSIS — I10 PRIMARY HYPERTENSION: ICD-10-CM

## 2023-04-26 RX ORDER — LISINOPRIL 10 MG/1
TABLET ORAL
Qty: 60 TABLET | OUTPATIENT
Start: 2023-04-26

## 2023-04-28 ENCOUNTER — OFFICE VISIT (OUTPATIENT)
Age: 58
End: 2023-04-28

## 2023-04-28 VITALS
TEMPERATURE: 98.1 F | RESPIRATION RATE: 18 BRPM | OXYGEN SATURATION: 97 % | SYSTOLIC BLOOD PRESSURE: 129 MMHG | HEIGHT: 74 IN | WEIGHT: 251.4 LBS | DIASTOLIC BLOOD PRESSURE: 88 MMHG | HEART RATE: 68 BPM | BODY MASS INDEX: 32.26 KG/M2

## 2023-04-28 DIAGNOSIS — E78.2 MIXED HYPERLIPIDEMIA: ICD-10-CM

## 2023-04-28 DIAGNOSIS — E66.9 OBESITY (BMI 30-39.9): ICD-10-CM

## 2023-04-28 DIAGNOSIS — I10 PRIMARY HYPERTENSION: Primary | ICD-10-CM

## 2023-04-28 PROCEDURE — 3079F DIAST BP 80-89 MM HG: CPT | Performed by: NURSE PRACTITIONER

## 2023-04-28 PROCEDURE — 99213 OFFICE O/P EST LOW 20 MIN: CPT | Performed by: NURSE PRACTITIONER

## 2023-04-28 PROCEDURE — 3074F SYST BP LT 130 MM HG: CPT | Performed by: NURSE PRACTITIONER

## 2023-04-28 RX ORDER — ATORVASTATIN CALCIUM 40 MG/1
40 TABLET, FILM COATED ORAL DAILY
Qty: 90 TABLET | Refills: 1 | Status: SHIPPED | OUTPATIENT
Start: 2023-04-28

## 2023-04-28 RX ORDER — LISINOPRIL 10 MG/1
10 TABLET ORAL EVERY MORNING
Qty: 90 TABLET | Refills: 1 | Status: SHIPPED | OUTPATIENT
Start: 2023-04-28

## 2023-04-28 RX ORDER — AMLODIPINE BESYLATE 10 MG/1
10 TABLET ORAL DAILY
Qty: 90 TABLET | Refills: 1 | Status: SHIPPED | OUTPATIENT
Start: 2023-04-28

## 2023-04-28 RX ORDER — TERBINAFINE HYDROCHLORIDE 250 MG/1
250 TABLET ORAL DAILY
COMMUNITY

## 2023-04-28 RX ORDER — LISINOPRIL AND HYDROCHLOROTHIAZIDE 20; 12.5 MG/1; MG/1
1 TABLET ORAL DAILY
Qty: 90 TABLET | Refills: 1 | Status: SHIPPED | OUTPATIENT
Start: 2023-04-28

## 2023-04-28 SDOH — ECONOMIC STABILITY: FOOD INSECURITY: WITHIN THE PAST 12 MONTHS, THE FOOD YOU BOUGHT JUST DIDN'T LAST AND YOU DIDN'T HAVE MONEY TO GET MORE.: NEVER TRUE

## 2023-04-28 SDOH — ECONOMIC STABILITY: INCOME INSECURITY: HOW HARD IS IT FOR YOU TO PAY FOR THE VERY BASICS LIKE FOOD, HOUSING, MEDICAL CARE, AND HEATING?: NOT HARD AT ALL

## 2023-04-28 SDOH — ECONOMIC STABILITY: FOOD INSECURITY: WITHIN THE PAST 12 MONTHS, YOU WORRIED THAT YOUR FOOD WOULD RUN OUT BEFORE YOU GOT MONEY TO BUY MORE.: NEVER TRUE

## 2023-04-28 SDOH — ECONOMIC STABILITY: HOUSING INSECURITY
IN THE LAST 12 MONTHS, WAS THERE A TIME WHEN YOU DID NOT HAVE A STEADY PLACE TO SLEEP OR SLEPT IN A SHELTER (INCLUDING NOW)?: NO

## 2023-04-28 ASSESSMENT — PATIENT HEALTH QUESTIONNAIRE - PHQ9
10. IF YOU CHECKED OFF ANY PROBLEMS, HOW DIFFICULT HAVE THESE PROBLEMS MADE IT FOR YOU TO DO YOUR WORK, TAKE CARE OF THINGS AT HOME, OR GET ALONG WITH OTHER PEOPLE: 0
SUM OF ALL RESPONSES TO PHQ QUESTIONS 1-9: 0
2. FEELING DOWN, DEPRESSED OR HOPELESS: 0
SUM OF ALL RESPONSES TO PHQ9 QUESTIONS 1 & 2: 0
5. POOR APPETITE OR OVEREATING: 0
1. LITTLE INTEREST OR PLEASURE IN DOING THINGS: 0
8. MOVING OR SPEAKING SO SLOWLY THAT OTHER PEOPLE COULD HAVE NOTICED. OR THE OPPOSITE, BEING SO FIGETY OR RESTLESS THAT YOU HAVE BEEN MOVING AROUND A LOT MORE THAN USUAL: 0
3. TROUBLE FALLING OR STAYING ASLEEP: 0
SUM OF ALL RESPONSES TO PHQ QUESTIONS 1-9: 0
9. THOUGHTS THAT YOU WOULD BE BETTER OFF DEAD, OR OF HURTING YOURSELF: 0
6. FEELING BAD ABOUT YOURSELF - OR THAT YOU ARE A FAILURE OR HAVE LET YOURSELF OR YOUR FAMILY DOWN: 0
4. FEELING TIRED OR HAVING LITTLE ENERGY: 0
7. TROUBLE CONCENTRATING ON THINGS, SUCH AS READING THE NEWSPAPER OR WATCHING TELEVISION: 0

## 2023-04-28 NOTE — ASSESSMENT & PLAN NOTE
Well-controlled, continue current medications   Limit sodium in diet to 2g/d  Initiate cardio exercise  Weight reduction  Increase water intake  Check BP and report if 3 consecutive readings greater than 139/89 to office

## 2023-04-28 NOTE — PROGRESS NOTES
[facility] [id number] [place] [address] [address], [address] [facility]  [phone] [facility]/[phone] fax    4/28/2023    John Melendez III 1965 is a pleasant White (non-) male. . . Todays concern/HPI:  No complaints or concerns. Denies CP, SOB, GI/ issues, dizziness, fatigue. Tolerating medications w/o side effects. Review of Systems - Negative except above. Past Medical History:   Diagnosis Date    Hypertension      Current Outpatient Medications   Medication Sig    terbinafine (LAMISIL) 250 MG tablet Take 1 tablet by mouth daily    amLODIPine (NORVASC) 10 MG tablet Take 1 tablet by mouth daily    atorvastatin (LIPITOR) 40 MG tablet Take 1 tablet by mouth daily    lisinopril (PRINIVIL;ZESTRIL) 10 MG tablet Take 1 tablet by mouth every morning    lisinopril-hydroCHLOROthiazide (PRINZIDE;ZESTORETIC) 20-12.5 MG per tablet Take 1 tablet by mouth daily    acetaminophen (TYLENOL) 325 MG tablet Take 3 tablets by mouth every 6 hours as needed    cyclobenzaprine (FLEXERIL) 10 MG tablet Take 1 tablet by mouth    Menthol, Topical Analgesic, (BIOFREEZE ROLL-ON) 4 % GEL Apply to affected area  Indications: sprains and strains     No current facility-administered medications for this visit. Physical:   /88   Pulse 68   Temp 98.1 °F (36.7 °C) (Temporal)   Resp 18   Ht 6' 2\" (1.88 m)   Wt 251 lb 6.4 oz (114 kg)   SpO2 97%   BMI 32.28 kg/m²     Exam:   Physical Exam  Constitutional:       Appearance: Normal appearance. He is obese. Eyes:      Extraocular Movements: Extraocular movements intact. Conjunctiva/sclera: Conjunctivae normal.   Cardiovascular:      Rate and Rhythm: Normal rate and regular rhythm. Pulmonary:      Effort: Pulmonary effort is normal.      Breath sounds: Normal breath sounds. Musculoskeletal:         General: Normal range of motion. Skin:     General: Skin is warm.    Neurological:      General:

## 2023-04-28 NOTE — ASSESSMENT & PLAN NOTE
Well-controlled, continue current medications    -185; LDL 69    Reduce fried fatty or oily foods in diet  Limit red meats, processed foods, and alcohol. Limit fast food  Increase physical activity to 60 minutes of moderate intensity aerobic exercise per week. Increase water intake  Reduce alcohol intake    How to raise HDL if low:  Consume oats, beans, legumes, olive oil, whole grains, nuts, seeds, fatty fish, and berries.   Lose weight/fat  Reduce alcohol consumption  Smoking cessation

## 2023-04-30 DIAGNOSIS — I10 PRIMARY HYPERTENSION: ICD-10-CM

## 2023-05-03 RX ORDER — AMLODIPINE BESYLATE 10 MG/1
TABLET ORAL
Qty: 90 TABLET | Refills: 1 | OUTPATIENT
Start: 2023-05-03

## 2023-05-03 RX ORDER — LISINOPRIL AND HYDROCHLOROTHIAZIDE 20; 12.5 MG/1; MG/1
TABLET ORAL
Qty: 90 TABLET | Refills: 1 | OUTPATIENT
Start: 2023-05-03

## 2023-06-28 ENCOUNTER — HOSPITAL ENCOUNTER (EMERGENCY)
Facility: HOSPITAL | Age: 58
Discharge: HOME | End: 2023-06-28
Attending: STUDENT IN AN ORGANIZED HEALTH CARE EDUCATION/TRAINING PROGRAM
Payer: COMMERCIAL

## 2023-06-28 VITALS
BODY MASS INDEX: 32.08 KG/M2 | DIASTOLIC BLOOD PRESSURE: 87 MMHG | WEIGHT: 250 LBS | RESPIRATION RATE: 20 BRPM | OXYGEN SATURATION: 98 % | HEIGHT: 74 IN | HEART RATE: 60 BPM | SYSTOLIC BLOOD PRESSURE: 145 MMHG | TEMPERATURE: 97.9 F

## 2023-06-28 DIAGNOSIS — M10.9 ACUTE GOUT OF LEFT WRIST, UNSPECIFIED CAUSE: Primary | ICD-10-CM

## 2023-06-28 PROCEDURE — 6360000002 HC RX W HCPCS: Performed by: STUDENT IN AN ORGANIZED HEALTH CARE EDUCATION/TRAINING PROGRAM

## 2023-06-28 PROCEDURE — 96374 THER/PROPH/DIAG INJ IV PUSH: CPT

## 2023-06-28 PROCEDURE — 6370000000 HC RX 637 (ALT 250 FOR IP): Performed by: STUDENT IN AN ORGANIZED HEALTH CARE EDUCATION/TRAINING PROGRAM

## 2023-06-28 PROCEDURE — 99284 EMERGENCY DEPT VISIT MOD MDM: CPT

## 2023-06-28 RX ORDER — HYDROCODONE BITARTRATE AND ACETAMINOPHEN 5; 325 MG/1; MG/1
1 TABLET ORAL EVERY 6 HOURS PRN
Qty: 10 TABLET | Refills: 0 | Status: SHIPPED | OUTPATIENT
Start: 2023-06-28 | End: 2023-07-01

## 2023-06-28 RX ORDER — PREDNISONE 20 MG/1
20 TABLET ORAL 2 TIMES DAILY
Qty: 10 TABLET | Refills: 0 | Status: SHIPPED | OUTPATIENT
Start: 2023-06-28 | End: 2023-07-03

## 2023-06-28 RX ORDER — KETOROLAC TROMETHAMINE 15 MG/ML
15 INJECTION, SOLUTION INTRAMUSCULAR; INTRAVENOUS
Status: COMPLETED | OUTPATIENT
Start: 2023-06-28 | End: 2023-06-28

## 2023-06-28 RX ORDER — PREDNISONE 20 MG/1
60 TABLET ORAL
Status: COMPLETED | OUTPATIENT
Start: 2023-06-28 | End: 2023-06-28

## 2023-06-28 RX ADMIN — KETOROLAC TROMETHAMINE 15 MG: 15 INJECTION, SOLUTION INTRAMUSCULAR; INTRAVENOUS at 09:09

## 2023-06-28 RX ADMIN — PREDNISONE 60 MG: 20 TABLET ORAL at 09:09

## 2023-06-28 ASSESSMENT — LIFESTYLE VARIABLES
HOW OFTEN DO YOU HAVE A DRINK CONTAINING ALCOHOL: 2-3 TIMES A WEEK
HOW MANY STANDARD DRINKS CONTAINING ALCOHOL DO YOU HAVE ON A TYPICAL DAY: 1 OR 2

## 2023-07-12 ENCOUNTER — TELEPHONE (OUTPATIENT)
Age: 58
End: 2023-07-12

## 2023-07-12 DIAGNOSIS — M10.032 ACUTE IDIOPATHIC GOUT OF LEFT WRIST: Primary | ICD-10-CM

## 2023-07-12 RX ORDER — MELOXICAM 15 MG/1
15 TABLET ORAL DAILY
Qty: 30 TABLET | Refills: 0 | Status: SHIPPED | OUTPATIENT
Start: 2023-07-12

## 2023-07-12 RX ORDER — COLCHICINE 0.6 MG/1
TABLET ORAL
Qty: 11 TABLET | Refills: 0 | Status: SHIPPED | OUTPATIENT
Start: 2023-07-12

## 2023-07-12 NOTE — TELEPHONE ENCOUNTER
Followed up with patient regarding gout issue.  Patient was made aware that Dr Robin Ledezma will send treatment to his referred pharmacy HIPPA verified no further concerns at this time

## 2023-07-12 NOTE — TELEPHONE ENCOUNTER
NURSE TRIAGE CALL   pt was seen at [facility]  06/28 for gout -in his hand as of today   He said he is having extreme pain now it is going up to his elbow , he is asking to be seen or get something sent to his pharmacy   [phone] or [phone]

## 2023-07-12 NOTE — TELEPHONE ENCOUNTER
3:40pm spoke with pt. Treated in ED for gout flare left wrist. Unable to turn a door knob. Pain is severe. Admits to eating wrong foods. Completed prednisone. Diagnosis Orders   1.  Acute idiopathic gout of left wrist  colchicine (COLCRYS) 0.6 MG tablet    meloxicam (MOBIC) 15 MG tablet

## 2023-09-12 RX ORDER — INDOMETHACIN 50 MG/1
50 CAPSULE ORAL 3 TIMES DAILY
Qty: 90 CAPSULE | Refills: 0 | Status: SHIPPED | OUTPATIENT
Start: 2023-09-12

## 2023-09-12 NOTE — TELEPHONE ENCOUNTER
Refill req.  Indomethacin 50 mg capsule    Pharmacy [facility] [phone]      Please advise

## 2023-10-30 DIAGNOSIS — I10 PRIMARY HYPERTENSION: ICD-10-CM

## 2023-10-30 DIAGNOSIS — E78.2 MIXED HYPERLIPIDEMIA: ICD-10-CM

## 2023-10-31 RX ORDER — ATORVASTATIN CALCIUM 40 MG/1
40 TABLET, FILM COATED ORAL DAILY
Qty: 90 TABLET | Refills: 0 | Status: SHIPPED | OUTPATIENT
Start: 2023-10-31 | End: 2023-12-15 | Stop reason: SDUPTHER

## 2023-10-31 RX ORDER — LISINOPRIL AND HYDROCHLOROTHIAZIDE 20; 12.5 MG/1; MG/1
1 TABLET ORAL DAILY
Qty: 90 TABLET | Refills: 0 | Status: SHIPPED | OUTPATIENT
Start: 2023-10-31 | End: 2023-12-15 | Stop reason: SDUPTHER

## 2023-10-31 RX ORDER — AMLODIPINE BESYLATE 10 MG/1
10 TABLET ORAL DAILY
Qty: 90 TABLET | Refills: 0 | Status: SHIPPED | OUTPATIENT
Start: 2023-10-31 | End: 2023-12-15 | Stop reason: SDUPTHER

## 2023-10-31 NOTE — TELEPHONE ENCOUNTER
Please refill if appropriate or refuse medication if not appropriate.     PCP: Cassandra Coleman DNP     Last appt: 4/28/23    Last refill: 4/28/23      Future Appointments   Date Time Provider [address]   4/25/2024  8:00 AM [facility] LAB VISIT [facility] AMB   5/2/2024  2:45 PM Maureen Horn DNP [facility] BS AMB         Requested Prescriptions     Pending Prescriptions Disp Refills    amLODIPine (NORVASC) 10 MG tablet [Pharmacy Med Name: amLODIPine BESYLATE 10 MG TAB] 90 tablet 1     Sig: TAKE ONE TABLET BY MOUTH DAILY    lisinopril-hydroCHLOROthiazide (PRINZIDE;ZESTORETIC) 20-12.5 MG per tablet [Pharmacy Med Name: LISINOPRIL-HCTZ 20-12.5 MG TAB] 90 tablet 1     Sig: TAKE ONE TABLET BY MOUTH DAILY    atorvastatin (LIPITOR) 40 MG tablet [Pharmacy Med Name: ATORVASTATIN 40 MG TABLET] 90 tablet 1     Sig: TAKE ONE TABLET BY MOUTH DAILY

## 2023-11-18 DIAGNOSIS — I10 PRIMARY HYPERTENSION: ICD-10-CM

## 2023-11-22 RX ORDER — LISINOPRIL 10 MG/1
10 TABLET ORAL EVERY MORNING
Qty: 90 TABLET | Refills: 1 | OUTPATIENT
Start: 2023-11-22

## 2023-11-28 ENCOUNTER — TELEMEDICINE (OUTPATIENT)
Age: 58
End: 2023-11-28

## 2023-11-28 ENCOUNTER — TELEPHONE (OUTPATIENT)
Age: 58
End: 2023-11-28

## 2023-11-28 DIAGNOSIS — M10.032 ACUTE IDIOPATHIC GOUT OF LEFT WRIST: Primary | ICD-10-CM

## 2023-11-28 PROCEDURE — 99213 OFFICE O/P EST LOW 20 MIN: CPT | Performed by: NURSE PRACTITIONER

## 2023-11-28 RX ORDER — COLCHICINE 0.6 MG/1
TABLET ORAL
Qty: 11 TABLET | Refills: 0 | Status: SHIPPED | OUTPATIENT
Start: 2023-11-28

## 2023-11-28 RX ORDER — HYDROCODONE BITARTRATE AND ACETAMINOPHEN 5; 325 MG/1; MG/1
1 TABLET ORAL EVERY 6 HOURS PRN
Qty: 28 TABLET | Refills: 0 | Status: SHIPPED | OUTPATIENT
Start: 2023-11-28 | End: 2023-12-05

## 2023-11-28 RX ORDER — PREDNISONE 20 MG/1
20 TABLET ORAL 2 TIMES DAILY
Qty: 10 TABLET | Refills: 0 | Status: SHIPPED | OUTPATIENT
Start: 2023-11-28 | End: 2023-12-03

## 2023-11-28 NOTE — TELEPHONE ENCOUNTER
Pt is having a gout attack in his left wrist and   Arm - pain is bad he had to stay home from work   He is asking for something to be sent to his pharmacy for both the gout and the pain   [facility]

## 2023-11-28 NOTE — PROGRESS NOTES
atraumatic  [] Abnormal - . [x] Mouth/Throat: Mucous membranes are moist    Pulmonary/Chest: [x] Respiratory effort normal   [x] No visualized signs of difficulty breathing or respiratory distress        [] Abnormal -      Musculoskeletal:   [] Normal gait with no signs of ataxia         [] Normal range of motion of neck        [x] Abnormal - swelling from hand to Lt elbow. No redness. Noted limited movement with fingers. Skin:        [] No significant exanthematous lesions or discoloration noted on facial skin         [] Abnormal -            Psychiatric:       [x] Normal Affect [] Abnormal -        [] No Hallucinations    Plan:  1. Acute idiopathic gout of left wrist  Assessment & Plan:  Initiate prednisone 20 mg twice daily x 5 days  Initiate [place]  Sent in refill colchicine. Instructed patient to start colchicine therapy at the start of his symptoms along with NSAIDs to help combat gout flare  Avoid alcohol and seafood as this is his definite trigger    Inform patient if swelling of the left upper extremity does not improve with current treatment, patient to call office to receive orders for PVLs  Patient denies trauma to his arm and claims swelling is normal for his gout flareups. But there is still some concern for possible DVT    Orders:  -     predniSONE (DELTASONE) 20 MG tablet; Take 1 tablet by mouth 2 times daily for 5 days, Disp-10 tablet, R-0Normal  -     HYDROcodone-acetaminophen (NORCO) 5-325 MG per tablet; Take 1 tablet by mouth every 6 hours as needed for Pain for up to 7 days. Intended supply: 3 days. Take lowest dose possible to manage pain Max Daily Amount: 4 tablets, Disp-28 tablet, R-0Normal  -     colchicine (COLCRYS) 0.6 MG tablet; Take 2 tabs now and then 1 tab 1 hour later, followed by 1 tab daily for 7 days. , Disp-11 tablet, R-0Normal        Medication reconciliation completed. Return if symptoms worsen or fail to improve.     Location:  Provider-in office  Patient: home

## 2023-11-28 NOTE — TELEPHONE ENCOUNTER
I have not seen patient since April. Schedule virtual please. I have availability at 11:30 this morning.

## 2023-11-28 NOTE — ASSESSMENT & PLAN NOTE
Initiate prednisone 20 mg twice daily x 5 days  Initiate [place]  Sent in refill colchicine. Instructed patient to start colchicine therapy at the start of his symptoms along with NSAIDs to help combat gout flare  Avoid alcohol and seafood as this is his definite trigger    Inform patient if swelling of the left upper extremity does not improve with current treatment, patient to call office to receive orders for PVLs  Patient denies trauma to his arm and claims swelling is normal for his gout flareups.   But there is still some concern for possible DVT

## 2023-12-15 DIAGNOSIS — I10 PRIMARY HYPERTENSION: ICD-10-CM

## 2023-12-15 DIAGNOSIS — E78.2 MIXED HYPERLIPIDEMIA: ICD-10-CM

## 2023-12-15 RX ORDER — LISINOPRIL AND HYDROCHLOROTHIAZIDE 20; 12.5 MG/1; MG/1
1 TABLET ORAL DAILY
Qty: 93 TABLET | Refills: 1 | Status: SHIPPED | OUTPATIENT
Start: 2023-12-15

## 2023-12-15 RX ORDER — ATORVASTATIN CALCIUM 40 MG/1
40 TABLET, FILM COATED ORAL DAILY
Qty: 93 TABLET | Refills: 1 | Status: SHIPPED | OUTPATIENT
Start: 2023-12-15

## 2023-12-15 RX ORDER — AMLODIPINE BESYLATE 10 MG/1
10 TABLET ORAL DAILY
Qty: 93 TABLET | Refills: 1 | Status: SHIPPED | OUTPATIENT
Start: 2023-12-15

## 2023-12-15 RX ORDER — LISINOPRIL 10 MG/1
10 TABLET ORAL EVERY MORNING
Qty: 90 TABLET | Refills: 1 | OUTPATIENT
Start: 2023-12-15

## 2023-12-15 NOTE — PROGRESS NOTES
Diagnosis Orders   1. Primary hypertension  amLODIPine (NORVASC) 10 MG tablet    lisinopril-hydroCHLOROthiazide (PRINZIDE;ZESTORETIC) 20-12.5 MG per tablet      2. Mixed hyperlipidemia  atorvastatin (LIPITOR) 40 MG tablet        It appears Dr Lc Liz combined the lisinorpil and HCTZ together.  Dc'd lisinopril 10mg as this is no longer needed

## 2023-12-18 DIAGNOSIS — I10 PRIMARY HYPERTENSION: ICD-10-CM

## 2023-12-19 RX ORDER — LISINOPRIL 10 MG/1
10 TABLET ORAL EVERY MORNING
Qty: 90 TABLET | Refills: 1 | OUTPATIENT
Start: 2023-12-19

## 2024-04-12 ENCOUNTER — TELEMEDICINE (OUTPATIENT)
Age: 59
End: 2024-04-12

## 2024-04-12 ENCOUNTER — TELEPHONE (OUTPATIENT)
Age: 59
End: 2024-04-12

## 2024-04-12 DIAGNOSIS — M10.9 ACUTE GOUT INVOLVING TOE OF LEFT FOOT, UNSPECIFIED CAUSE: Primary | ICD-10-CM

## 2024-04-12 PROCEDURE — 99213 OFFICE O/P EST LOW 20 MIN: CPT | Performed by: NURSE PRACTITIONER

## 2024-04-12 RX ORDER — COLCHICINE 0.6 MG/1
TABLET ORAL
Qty: 11 TABLET | Refills: 1 | Status: SHIPPED | OUTPATIENT
Start: 2024-04-12

## 2024-04-12 RX ORDER — HYDROCODONE BITARTRATE AND ACETAMINOPHEN 5; 325 MG/1; MG/1
1 TABLET ORAL EVERY 6 HOURS PRN
Qty: 10 TABLET | Refills: 0 | Status: SHIPPED | OUTPATIENT
Start: 2024-04-12 | End: 2024-04-17

## 2024-04-12 RX ORDER — PREDNISONE 10 MG/1
TABLET ORAL
Qty: 10 TABLET | Refills: 0 | Status: SHIPPED | OUTPATIENT
Start: 2024-04-12

## 2024-04-12 NOTE — ASSESSMENT & PLAN NOTE
Initiate pred x5d.  Initiate colchicine w/ 1 refill for future flares  Initiate Norco due to unable to take NSAIDs while on Pred therapy  Discussed possible causes for gout flares to include beer and jasmina consumption along with HCTZ therapy.   Stay well hydrated

## 2024-04-12 NOTE — PROGRESS NOTES
[facility]  [address]  [address]  [place] [address]  [phone] office/[phone] fax    4/12/2024    HPI:   Gary Pitt III 1965 is a pleasant White (non-) male who presents virtually.    Todays concern/HPI:  Gout. Left foot great toe, going up arch of foot to ankle. Drinking beer and eating jasmina ice cream. Started yesterday am. Took indocin 2x yesterday. Some improvement. Got up 4am today, pain severe, almost 10/10. Unable to take allopurinol due to causes joint pain. He is considering retry with allopurinol at a lower dose.    Review of Systems - Negative except above      Past Medical History:   Diagnosis Date    Gout     Hypertension      Past Surgical History:   Procedure Laterality Date    HEENT      ORTHOPEDIC SURGERY       Current Outpatient Medications   Medication Sig    colchicine (COLCRYS) 0.6 MG tablet Take 2 tabs now and then 1 tab 1 hour later, followed by 1 tab daily for 7 days.    predniSONE (DELTASONE) 10 MG tablet Take 1 tab by mouth 2 times daily for 5 days for gout flare    HYDROcodone-acetaminophen (NORCO) 5-325 MG per tablet Take 1 tablet by mouth every 6 hours as needed for Pain for up to 5 days. Intended supply: 3 days. Take lowest dose possible to manage pain Max Daily Amount: 4 tablets    lisinopril (PRINIVIL;ZESTRIL) 10 MG tablet Take 1 tab by mouth with Lisinopril/HCTZ 20/12.5mg daily    amLODIPine (NORVASC) 10 MG tablet Take 1 tablet by mouth daily    atorvastatin (LIPITOR) 40 MG tablet Take 1 tablet by mouth daily    lisinopril-hydroCHLOROthiazide (PRINZIDE;ZESTORETIC) 20-12.5 MG per tablet Take 1 tablet by mouth daily    indomethacin (INDOCIN) 50 MG capsule TAKE ONE CAPSULE BY MOUTH THREE TIMES A DAY    terbinafine (LAMISIL) 250 MG tablet Take 1 tablet by mouth daily    acetaminophen (TYLENOL) 325 MG tablet Take 3 tablets by mouth every 6 hours as needed    cyclobenzaprine (FLEXERIL) 10 MG tablet Take 1 tablet by mouth

## 2024-04-12 NOTE — PROGRESS NOTES
\"Have you been to the ER, urgent care clinic since your last visit?  Hospitalized since your last visit?\"    NO    “Have you seen or consulted any other health care providers outside of [facility] since your last visit?”    NO    “Have you had a colorectal cancer screening such as a colonoscopy/FIT/Cologuard?    NO    No colonoscopy on file  No cologuard on file  No FIT/FOBT on file   No flexible sigmoidoscopy on file

## 2024-04-12 NOTE — TELEPHONE ENCOUNTER
Reason for Call: Established Patient Appointment needed: Urgent (Patient   Request) No Script     QUESTIONS     Reason for appointment request? No appointments available during search      Additional Information for Provider? pt needed an appt but could not wait   for an appt for monday. He has gout symptoms and either wants to be seen   today or have medications to treat it   ---------------------------------------------------------------------------   --------------   CALL BACK INFO   [phone]; OK to leave message on [employer]

## 2024-04-23 ENCOUNTER — TELEPHONE (OUTPATIENT)
Age: 59
End: 2024-04-23

## 2024-04-23 DIAGNOSIS — I10 PRIMARY HYPERTENSION: Primary | ICD-10-CM

## 2024-04-23 DIAGNOSIS — E78.2 MIXED HYPERLIPIDEMIA: ICD-10-CM

## 2024-04-24 NOTE — TELEPHONE ENCOUNTER
Diagnosis Orders   1. Primary hypertension  Comprehensive Metabolic Panel      2. Mixed hyperlipidemia  Lipid Panel        Appt 5/2/24

## 2024-04-25 ENCOUNTER — HOSPITAL ENCOUNTER (OUTPATIENT)
Facility: HOSPITAL | Age: 59
Setting detail: SPECIMEN
Discharge: HOME | End: 2024-04-25

## 2024-04-25 DIAGNOSIS — E78.2 MIXED HYPERLIPIDEMIA: ICD-10-CM

## 2024-04-25 DIAGNOSIS — I10 PRIMARY HYPERTENSION: ICD-10-CM

## 2024-04-25 LAB
ALBUMIN SERPL-MCNC: 4.1 G/DL (ref 3.4–5)
ALBUMIN/GLOB SERPL: 1.3 (ref 0.8–1.7)
ALP SERPL-CCNC: 67 U/L (ref 45–117)
ALT SERPL-CCNC: 35 U/L (ref 16–61)
ANION GAP SERPL CALC-SCNC: 6 MMOL/L (ref 3–18)
AST SERPL-CCNC: 16 U/L (ref 10–38)
BILIRUB SERPL-MCNC: 0.7 MG/DL (ref 0.2–1)
BUN SERPL-MCNC: 14 MG/DL (ref 7–18)
BUN/CREAT SERPL: 13 (ref 12–20)
CALCIUM SERPL-MCNC: 8.8 MG/DL (ref 8.5–10.1)
CHLORIDE SERPL-SCNC: 104 MMOL/L (ref 100–111)
CHOLEST SERPL-MCNC: 173 MG/DL
CO2 SERPL-SCNC: 27 MMOL/L (ref 21–32)
CREAT SERPL-MCNC: 1.08 MG/DL (ref 0.6–1.3)
GLOBULIN SER CALC-MCNC: 3.1 G/DL (ref 2–4)
GLUCOSE SERPL-MCNC: 108 MG/DL (ref 74–99)
HDLC SERPL-MCNC: 48 MG/DL (ref 40–60)
HDLC SERPL: 3.6 (ref 0–5)
LDLC SERPL CALC-MCNC: 47.8 MG/DL (ref 0–100)
LIPID PANEL: ABNORMAL
POTASSIUM SERPL-SCNC: 4 MMOL/L (ref 3.5–5.5)
PROT SERPL-MCNC: 7.2 G/DL (ref 6.4–8.2)
SODIUM SERPL-SCNC: 137 MMOL/L (ref 136–145)
TRIGL SERPL-MCNC: 386 MG/DL
VLDLC SERPL CALC-MCNC: 77.2 MG/DL

## 2024-04-25 PROCEDURE — 80061 LIPID PANEL: CPT

## 2024-04-25 PROCEDURE — 36415 COLL VENOUS BLD VENIPUNCTURE: CPT

## 2024-04-25 PROCEDURE — 80053 COMPREHEN METABOLIC PANEL: CPT

## 2024-05-02 ENCOUNTER — OFFICE VISIT (OUTPATIENT)
Age: 59
End: 2024-05-02

## 2024-05-02 VITALS
BODY MASS INDEX: 31.83 KG/M2 | TEMPERATURE: 97.8 F | DIASTOLIC BLOOD PRESSURE: 80 MMHG | SYSTOLIC BLOOD PRESSURE: 124 MMHG | WEIGHT: 248 LBS | HEART RATE: 68 BPM | HEIGHT: 74 IN | OXYGEN SATURATION: 98 %

## 2024-05-02 DIAGNOSIS — M10.9 ACUTE GOUT INVOLVING TOE OF LEFT FOOT, UNSPECIFIED CAUSE: ICD-10-CM

## 2024-05-02 DIAGNOSIS — Z87.39 HISTORY OF GOUT: ICD-10-CM

## 2024-05-02 DIAGNOSIS — R06.02 SOB (SHORTNESS OF BREATH): ICD-10-CM

## 2024-05-02 DIAGNOSIS — E78.2 MIXED HYPERLIPIDEMIA: ICD-10-CM

## 2024-05-02 DIAGNOSIS — L55.0 SUNBURN OF FIRST DEGREE: ICD-10-CM

## 2024-05-02 DIAGNOSIS — I10 PRIMARY HYPERTENSION: Primary | ICD-10-CM

## 2024-05-02 DIAGNOSIS — M1A.0720 CHRONIC GOUT OF LEFT FOOT, UNSPECIFIED CAUSE: ICD-10-CM

## 2024-05-02 DIAGNOSIS — Z72.0 CURRENT OCCASIONAL SMOKER: ICD-10-CM

## 2024-05-02 PROBLEM — M10.032 ACUTE IDIOPATHIC GOUT OF LEFT WRIST: Status: RESOLVED | Noted: 2023-11-28 | Resolved: 2024-05-02

## 2024-05-02 RX ORDER — ATORVASTATIN CALCIUM 40 MG/1
40 TABLET, FILM COATED ORAL DAILY
Qty: 93 TABLET | Refills: 1 | Status: SHIPPED | OUTPATIENT
Start: 2024-05-02

## 2024-05-02 RX ORDER — LISINOPRIL 10 MG/1
TABLET ORAL
Qty: 93 TABLET | Refills: 1 | Status: SHIPPED | OUTPATIENT
Start: 2024-05-02

## 2024-05-02 RX ORDER — LISINOPRIL AND HYDROCHLOROTHIAZIDE 20; 12.5 MG/1; MG/1
1 TABLET ORAL DAILY
Qty: 93 TABLET | Refills: 1 | Status: SHIPPED | OUTPATIENT
Start: 2024-05-02

## 2024-05-02 RX ORDER — ALBUTEROL SULFATE 90 UG/1
2 AEROSOL, METERED RESPIRATORY (INHALATION) EVERY 6 HOURS PRN
Qty: 1 EACH | Refills: 1 | Status: SHIPPED | OUTPATIENT
Start: 2024-05-02

## 2024-05-02 RX ORDER — ALLOPURINOL 100 MG/1
100 TABLET ORAL DAILY
Qty: 95 TABLET | Refills: 1 | Status: SHIPPED | OUTPATIENT
Start: 2024-05-02

## 2024-05-02 RX ORDER — AMLODIPINE BESYLATE 10 MG/1
10 TABLET ORAL DAILY
Qty: 93 TABLET | Refills: 1 | Status: SHIPPED | OUTPATIENT
Start: 2024-05-02

## 2024-05-02 SDOH — ECONOMIC STABILITY: FOOD INSECURITY: WITHIN THE PAST 12 MONTHS, THE FOOD YOU BOUGHT JUST DIDN'T LAST AND YOU DIDN'T HAVE MONEY TO GET MORE.: NEVER TRUE

## 2024-05-02 SDOH — ECONOMIC STABILITY: FOOD INSECURITY: WITHIN THE PAST 12 MONTHS, YOU WORRIED THAT YOUR FOOD WOULD RUN OUT BEFORE YOU GOT MONEY TO BUY MORE.: NEVER TRUE

## 2024-05-02 SDOH — ECONOMIC STABILITY: INCOME INSECURITY: HOW HARD IS IT FOR YOU TO PAY FOR THE VERY BASICS LIKE FOOD, HOUSING, MEDICAL CARE, AND HEATING?: NOT HARD AT ALL

## 2024-05-02 ASSESSMENT — PATIENT HEALTH QUESTIONNAIRE - PHQ9
2. FEELING DOWN, DEPRESSED OR HOPELESS: NOT AT ALL
SUM OF ALL RESPONSES TO PHQ QUESTIONS 1-9: 0
SUM OF ALL RESPONSES TO PHQ QUESTIONS 1-9: 0
SUM OF ALL RESPONSES TO PHQ9 QUESTIONS 1 & 2: 0
SUM OF ALL RESPONSES TO PHQ QUESTIONS 1-9: 0
SUM OF ALL RESPONSES TO PHQ QUESTIONS 1-9: 0
1. LITTLE INTEREST OR PLEASURE IN DOING THINGS: NOT AT ALL

## 2024-05-02 NOTE — PROGRESS NOTES
Gary Pitt III presents today for   Chief Complaint   Patient presents with    Follow-up                 1. \"Have you been to the ER, urgent care clinic since your last visit?  Hospitalized since your last visit?\" no    2. \"Have you seen or consulted any other health care providers outside of the [facility] since your last visit?\" no     3. For patients aged 45-75: Has the patient had a colonoscopy / FIT/ Cologuard? No      If the patient is female:    4. For patients aged 40-74: Has the patient had a mammogram within the past 2 years? No      5. For patients aged 21-65: Has the patient had a pap smear? No

## 2024-05-02 NOTE — ASSESSMENT & PLAN NOTE
The patient was informed that HCTZ, a part of his antihypertensive medication, can induce gout flares, including chocolate, red meat, and alcohol consumption. The patient was advised to maintain his colchicine 0.6 mg for emergency flare-ups. A prescription for allopurinol 100 mg daily was issued. The patient was advised to reduce his allopurinol dosage to half a tablet if he experiences joint pain, while maintaining the 100 mg dosage if no joint pain occurs. Should the patient experience another gout flare-up while on allopurinol 100 mg, the dosage may be increased by the patient to 200 mg and inform me of the dose change.  Obtain uric acid level 6 months per patient request

## 2024-05-02 NOTE — ASSESSMENT & PLAN NOTE
The patient was advised to apply sunscreen and a rashguard shirt to protect his arms from dir sunlightect and help to prevent skin cancer

## 2024-05-02 NOTE — PROGRESS NOTES
[facility] [address]  [address]  [place] [id number]  [phone] office/[phone] fax    5/2/2024    Gary Pitt III 1965 is a pleasant White (non-) male.       History of Present Illness  The patient presents for regular follow-up.    He continues lisinopril 10 mg along with Prinzide 20/12.5 daily he is also taking amlodipine 10 mg daily for his blood pressure.  He denies shortness of breath, chest pain, fatigue.    The patient abstained from alcohol since the previous Saturday and has abstained from consuming hot sauce and peppers. His diet primarily consists of fried fish, chicken, and salt and pepper. His current medication regimen includes atorvastatin 40 mg for cholesterol management.    The patient has been attempting to engage in physical exercise during gout flare-ups, however, these efforts have been hindered by his rotator cuff and hip issues. He has one refill of colchicine remaining from his previous prescription, which he took during a gout flare-up in 11/2023. This is the first gout flare-up in his left foot, which initially manifested in the top of his right foot, but predominantly affects his left hand. He has completed his course of prednisone. He suspects that allopurinol 100 mg may be affecting his joints, but is uncertain. He maintains an active lifestyle, including walking at work, and limits his chocolate intake to once a week or once every two weeks. His daily diet includes bananas and grapes.  He struggles to get up his T-Bone steaks and beer.    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 He reports occasional shortness of breath, which he attributes to his advancing age and working out in the [employer] and smoking. His smoking habit has decreased from a pack a day to 12 to 15 cigarettes only when drinking. he does not smoke

## 2024-05-02 NOTE — ASSESSMENT & PLAN NOTE
The patient was educated on the importance of protecting his lungs from dust and metal exposure, wear mask when working outdoors. A prescription for an inhaler with a refill was issued since albuterol is effective for. Should the patient require the inhaler more frequently, a PFT study will be considered.    DDx:  Allergens  Smoking  Obese habitus

## 2024-05-02 NOTE — ASSESSMENT & PLAN NOTE
The patient's triglyceride levels have shown a significant increase. The patient was counseled on the importance of reducing his consumption of oily foods and to incorporate more fruits and vegetables into his diet.  Continue atorvastatin 40 mg. Should his triglyceride levels not decrease, the addition of Zetia will be considered and/or increase atorvastatin dose to 80.  Recheck level 6 months

## 2024-07-10 DIAGNOSIS — M1A.0720 CHRONIC GOUT OF LEFT FOOT, UNSPECIFIED CAUSE: ICD-10-CM

## 2024-07-10 RX ORDER — ALLOPURINOL 100 MG/1
200 TABLET ORAL DAILY
Qty: 180 TABLET | Refills: 1 | Status: SHIPPED | OUTPATIENT
Start: 2024-07-10

## 2024-07-10 NOTE — TELEPHONE ENCOUNTER
Last OV: 5/2/2024  Next OV: 11/7/2024  Last refill: 5/2/2024      Please refuse if medication will be filled at appointment.

## 2024-08-03 DIAGNOSIS — E78.2 MIXED HYPERLIPIDEMIA: ICD-10-CM

## 2024-08-04 RX ORDER — ATORVASTATIN CALCIUM 40 MG/1
40 TABLET, FILM COATED ORAL DAILY
Qty: 90 TABLET | OUTPATIENT
Start: 2024-08-04

## 2024-09-15 DIAGNOSIS — I10 PRIMARY HYPERTENSION: ICD-10-CM

## 2024-09-16 RX ORDER — LISINOPRIL AND HYDROCHLOROTHIAZIDE 12.5; 2 MG/1; MG/1
1 TABLET ORAL DAILY
Qty: 90 TABLET | OUTPATIENT
Start: 2024-09-16

## 2024-10-07 ENCOUNTER — TELEMEDICINE (OUTPATIENT)
Facility: CLINIC | Age: 59
End: 2024-10-07

## 2024-10-07 DIAGNOSIS — M10.9 ACUTE GOUT OF LEFT WRIST, UNSPECIFIED CAUSE: Primary | ICD-10-CM

## 2024-10-07 PROCEDURE — 99213 OFFICE O/P EST LOW 20 MIN: CPT | Performed by: NURSE PRACTITIONER

## 2024-10-07 RX ORDER — COLCHICINE 0.6 MG/1
TABLET ORAL
Qty: 11 TABLET | Refills: 1 | Status: SHIPPED | OUTPATIENT
Start: 2024-10-07

## 2024-10-07 RX ORDER — HYDROCODONE BITARTRATE AND ACETAMINOPHEN 5; 325 MG/1; MG/1
1 TABLET ORAL EVERY 6 HOURS PRN
Qty: 10 TABLET | Refills: 0 | Status: SHIPPED | OUTPATIENT
Start: 2024-10-07 | End: 2024-10-10

## 2024-10-07 NOTE — PROGRESS NOTES
\"Have you been to the ER, urgent care clinic since your last visit?  Hospitalized since your last visit?\"    NO    “Have you seen or consulted any other health care providers outside our system since your last visit?”    NO             
-noted swelling to the left wrist.  Mild erythremia noted        Skin:        [x] No significant exanthematous lesions or discoloration noted on facial skin         [] Abnormal -            Psychiatric:       [x] Normal Affect [] Abnormal -        [] No Hallucinations      Results      Assessment & Plan    1. Acute gout of left wrist, unspecified cause  Assessment & Plan:  The patient reports severe pain in the left wrist, extending to the forearm, with a pain score of 10/10. He experienced numbness in his fingers last night. He has a history of gout flares, previously managed with colchicine and Norco. He is currently taking allopurinol daily. A prescription for Norco, to be taken every 6 hours as needed for pain over a period of 3 days, will be provided. A total of 10 tablets will be dispensed. The prescription for colchicine will also be refilled.  Continue allopurinol as a preventative 200 mg daily  Orders:  -     colchicine (COLCRYS) 0.6 MG tablet; Take 2 tabs now and then 1 tab 1 hour later, followed by 1 tab daily for 7 days., Disp-11 tablet, R-1Normal  -     HYDROcodone-acetaminophen (NORCO) 5-325 MG per tablet; Take 1 tablet by mouth every 6 hours as needed for Pain for up to 3 days. Intended supply: 3 days. Take lowest dose possible to manage pain Max Daily Amount: 4 tablets, Disp-10 tablet, R-0Normal        Reviewed medication and completed medication reconciliation with the patient. Reviewed side effects of medications with the patient. Questions were answered and patient verb understanding.    Past Medical History:   Diagnosis Date    Gout     Hypertension      Current Outpatient Medications   Medication Sig    colchicine (COLCRYS) 0.6 MG tablet Take 2 tabs now and then 1 tab 1 hour later, followed by 1 tab daily for 7 days.    HYDROcodone-acetaminophen (NORCO) 5-325 MG per tablet Take 1 tablet by mouth every 6 hours as needed for Pain for up to 3 days. Intended supply: 3 days. Take lowest dose possible

## 2024-10-08 PROBLEM — M10.9 ACUTE GOUT OF LEFT WRIST: Status: ACTIVE | Noted: 2024-10-08

## 2024-10-08 NOTE — ASSESSMENT & PLAN NOTE
The patient reports severe pain in the left wrist, extending to the forearm, with a pain score of 10/10. He experienced numbness in his fingers last night. He has a history of gout flares, previously managed with colchicine and Norco. He is currently taking allopurinol daily. A prescription for Norco, to be taken every 6 hours as needed for pain over a period of 3 days, will be provided. A total of 10 tablets will be dispensed. The prescription for colchicine will also be refilled.  Continue allopurinol as a preventative 200 mg daily

## 2024-10-21 ENCOUNTER — HOSPITAL ENCOUNTER (OUTPATIENT)
Facility: HOSPITAL | Age: 59
Setting detail: SPECIMEN
Discharge: HOME | End: 2024-10-24

## 2024-10-21 DIAGNOSIS — E78.2 MIXED HYPERLIPIDEMIA: ICD-10-CM

## 2024-10-21 DIAGNOSIS — M1A.0720 CHRONIC GOUT OF LEFT FOOT, UNSPECIFIED CAUSE: ICD-10-CM

## 2024-10-21 DIAGNOSIS — I10 PRIMARY HYPERTENSION: ICD-10-CM

## 2024-10-21 LAB
ALBUMIN SERPL-MCNC: 4.2 G/DL (ref 3.4–5)
ALBUMIN/GLOB SERPL: 1.5 (ref 0.8–1.7)
ALP SERPL-CCNC: 69 U/L (ref 45–117)
ALT SERPL-CCNC: 41 U/L (ref 16–61)
ANION GAP SERPL CALC-SCNC: 6 MMOL/L (ref 3–18)
AST SERPL-CCNC: 22 U/L (ref 10–38)
BILIRUB SERPL-MCNC: 1 MG/DL (ref 0.2–1)
BUN SERPL-MCNC: 15 MG/DL (ref 7–18)
BUN/CREAT SERPL: 15 (ref 12–20)
CALCIUM SERPL-MCNC: 9 MG/DL (ref 8.5–10.1)
CHLORIDE SERPL-SCNC: 102 MMOL/L (ref 100–111)
CHOLEST SERPL-MCNC: 154 MG/DL
CO2 SERPL-SCNC: 26 MMOL/L (ref 21–32)
CREAT SERPL-MCNC: 0.97 MG/DL (ref 0.6–1.3)
GLOBULIN SER CALC-MCNC: 2.8 G/DL (ref 2–4)
GLUCOSE SERPL-MCNC: 108 MG/DL (ref 74–99)
HDLC SERPL-MCNC: 47 MG/DL (ref 40–60)
HDLC SERPL: 3.3 (ref 0–5)
LDLC SERPL CALC-MCNC: 34.4 MG/DL (ref 0–100)
LIPID PANEL: ABNORMAL
POTASSIUM SERPL-SCNC: 4.1 MMOL/L (ref 3.5–5.5)
PROT SERPL-MCNC: 7 G/DL (ref 6.4–8.2)
SODIUM SERPL-SCNC: 134 MMOL/L (ref 136–145)
TRIGL SERPL-MCNC: 363 MG/DL
URATE SERPL-MCNC: 6.6 MG/DL (ref 2.6–7.2)
VLDLC SERPL CALC-MCNC: 72.6 MG/DL

## 2024-10-21 PROCEDURE — 84550 ASSAY OF BLOOD/URIC ACID: CPT

## 2024-10-21 PROCEDURE — 80061 LIPID PANEL: CPT

## 2024-10-21 PROCEDURE — 80053 COMPREHEN METABOLIC PANEL: CPT

## 2024-10-21 PROCEDURE — 36415 COLL VENOUS BLD VENIPUNCTURE: CPT

## 2024-11-03 DIAGNOSIS — E78.2 MIXED HYPERLIPIDEMIA: ICD-10-CM

## 2024-11-03 RX ORDER — ATORVASTATIN CALCIUM 40 MG/1
40 TABLET, FILM COATED ORAL DAILY
Qty: 90 TABLET | OUTPATIENT
Start: 2024-11-03

## 2024-11-07 ENCOUNTER — OFFICE VISIT (OUTPATIENT)
Facility: CLINIC | Age: 59
End: 2024-11-07

## 2024-11-07 VITALS
HEIGHT: 74 IN | HEART RATE: 70 BPM | BODY MASS INDEX: 32.34 KG/M2 | TEMPERATURE: 97.4 F | DIASTOLIC BLOOD PRESSURE: 84 MMHG | WEIGHT: 252 LBS | RESPIRATION RATE: 18 BRPM | OXYGEN SATURATION: 98 % | SYSTOLIC BLOOD PRESSURE: 136 MMHG

## 2024-11-07 DIAGNOSIS — I10 PRIMARY HYPERTENSION: Primary | ICD-10-CM

## 2024-11-07 DIAGNOSIS — Z12.5 SPECIAL SCREENING FOR MALIGNANT NEOPLASM OF PROSTATE: ICD-10-CM

## 2024-11-07 DIAGNOSIS — E78.2 MIXED HYPERLIPIDEMIA: ICD-10-CM

## 2024-11-07 DIAGNOSIS — E66.9 OBESITY (BMI 30-39.9): ICD-10-CM

## 2024-11-07 DIAGNOSIS — M1A.0720 CHRONIC GOUT OF LEFT FOOT, UNSPECIFIED CAUSE: ICD-10-CM

## 2024-11-07 DIAGNOSIS — E87.1 HYPONATREMIA: ICD-10-CM

## 2024-11-07 DIAGNOSIS — Z72.0 CURRENT OCCASIONAL SMOKER: ICD-10-CM

## 2024-11-07 DIAGNOSIS — Z87.891 PERSONAL HISTORY OF TOBACCO USE: ICD-10-CM

## 2024-11-07 DIAGNOSIS — Z23 NEED FOR VACCINATION WITH 20-POLYVALENT PNEUMOCOCCAL CONJUGATE VACCINE: ICD-10-CM

## 2024-11-07 RX ORDER — ALLOPURINOL 100 MG/1
200 TABLET ORAL DAILY
Qty: 180 TABLET | Refills: 1 | Status: SHIPPED | OUTPATIENT
Start: 2024-11-07

## 2024-11-07 RX ORDER — LISINOPRIL AND HYDROCHLOROTHIAZIDE 12.5; 2 MG/1; MG/1
1 TABLET ORAL DAILY
Qty: 90 TABLET | Refills: 1 | Status: SHIPPED | OUTPATIENT
Start: 2024-11-07

## 2024-11-07 RX ORDER — AMLODIPINE BESYLATE 10 MG/1
10 TABLET ORAL DAILY
Qty: 90 TABLET | Refills: 1 | Status: SHIPPED | OUTPATIENT
Start: 2024-11-07

## 2024-11-07 RX ORDER — COLCHICINE 0.6 MG/1
TABLET ORAL
Qty: 11 TABLET | Refills: 1 | Status: SHIPPED | OUTPATIENT
Start: 2024-11-07

## 2024-11-07 RX ORDER — LISINOPRIL 10 MG/1
TABLET ORAL
Qty: 90 TABLET | Refills: 1 | Status: SHIPPED | OUTPATIENT
Start: 2024-11-07

## 2024-11-07 RX ORDER — ATORVASTATIN CALCIUM 40 MG/1
40 TABLET, FILM COATED ORAL DAILY
Qty: 90 TABLET | Refills: 1 | Status: SHIPPED | OUTPATIENT
Start: 2024-11-07

## 2024-11-07 RX ORDER — EZETIMIBE 10 MG/1
10 TABLET ORAL DAILY
Qty: 90 TABLET | Refills: 1 | Status: SHIPPED | OUTPATIENT
Start: 2024-11-07

## 2024-11-07 SDOH — ECONOMIC STABILITY: FOOD INSECURITY: WITHIN THE PAST 12 MONTHS, YOU WORRIED THAT YOUR FOOD WOULD RUN OUT BEFORE YOU GOT MONEY TO BUY MORE.: NEVER TRUE

## 2024-11-07 SDOH — ECONOMIC STABILITY: FOOD INSECURITY: WITHIN THE PAST 12 MONTHS, THE FOOD YOU BOUGHT JUST DIDN'T LAST AND YOU DIDN'T HAVE MONEY TO GET MORE.: NEVER TRUE

## 2024-11-07 SDOH — ECONOMIC STABILITY: INCOME INSECURITY: HOW HARD IS IT FOR YOU TO PAY FOR THE VERY BASICS LIKE FOOD, HOUSING, MEDICAL CARE, AND HEATING?: NOT HARD AT ALL

## 2024-11-07 ASSESSMENT — PATIENT HEALTH QUESTIONNAIRE - PHQ9
SUM OF ALL RESPONSES TO PHQ QUESTIONS 1-9: 0
SUM OF ALL RESPONSES TO PHQ QUESTIONS 1-9: 0
SUM OF ALL RESPONSES TO PHQ9 QUESTIONS 1 & 2: 0
SUM OF ALL RESPONSES TO PHQ QUESTIONS 1-9: 0
1. LITTLE INTEREST OR PLEASURE IN DOING THINGS: NOT AT ALL
2. FEELING DOWN, DEPRESSED OR HOPELESS: NOT AT ALL
SUM OF ALL RESPONSES TO PHQ QUESTIONS 1-9: 0

## 2024-11-07 NOTE — ASSESSMENT & PLAN NOTE
Sodium level 134  Most likely related to his excessive beer consumption  Possibly due to HCTZ  Patient also does not consume much water  He is asymptomatic  Instructed patient on the importance of reducing his beer consumption along with increasing his water intake.  Will reassess level in 6 months

## 2024-11-07 NOTE — ASSESSMENT & PLAN NOTE
Discussed with the patient the current USPSTF guidelines released March 9, 2021 for screening for lung cancer.    For adults aged 50 to 80 years who have a 20 pack-year smoking history and currently smoke or have quit within the past 15 years the grade B recommendation is to:  Screen for lung cancer with low-dose computed tomography (LDCT) every year.  Stop screening once a person has not smoked for 15 years or has a health problem that limits life expectancy or the ability to have lung surgery.    The patient  reports that he has been smoking cigarettes. He started smoking about 47 years ago. He has a 47.8 pack-year smoking history. He has been exposed to tobacco smoke. He has never used smokeless tobacco.. Discussed with patient the risks and benefits of screening, including over-diagnosis, false positive rate, and total radiation exposure.  The patient currently exhibits no signs or symptoms suggestive of lung cancer.  Discussed with patient the importance of compliance with yearly annual lung cancer screenings and willingness to undergo diagnosis and treatment if screening scan is positive.  In addition, the patient was counseled regarding the importance of remaining smoke free and/or total smoking cessation.    Also reviewed the following if the patient has Medicare that as of February 10, 2022, Medicare only covers LDCT screening in patients aged 50-77 with at least a 20 pack-year smoking history who currently smoke or have quit in the last 15 years

## 2024-11-07 NOTE — ASSESSMENT & PLAN NOTE
+12 pounds in 12 months  BMI is out of normal parameters and plan is as follows: Discussed in great detail on diet, portion control, exercise, avoiding foods high in sugar, carbs and starches, fatty/greasy foods and to eat until satisfied not til full. I have counseled this patient on diet and exercise regimens as well.

## 2024-11-07 NOTE — ASSESSMENT & PLAN NOTE
-363; LDL 34  Much discussion was had with patient on the importance of his diet and exercise along with smoking cessation  Discussed possible CV events that can occur with uncontrolled cholesterol    Continue atorvastatin 40 mg daily  Initiate Zetia 10 mg daily  Recheck level 6 months    Reduce fried fatty or oily foods in diet  Limit red meats, processed foods, and alcohol.    Limit fast food  Increase physical activity to 60 minutes of moderate intensity aerobic exercise per week.  Increase water intake  Reduce alcohol intake    How to raise HDL if low:  Consume oats, beans, legumes, olive oil, whole grains, nuts, seeds, fatty fish, and berries.  Lose weight/fat  Reduce alcohol consumption  Smoking cessation    5/24/2024 OV note:  The patient's triglyceride levels have shown a significant increase. The patient was counseled on the importance of reducing his consumption of oily foods and to incorporate more fruits and vegetables into his diet.      Continue atorvastatin 40 mg.     Should his triglyceride levels not decrease, the addition of Zetia will be considered and/or increase atorvastatin dose to 80.  Recheck level 6 months

## 2024-11-07 NOTE — ASSESSMENT & PLAN NOTE
Well-controlled, continue current medications   Continue amlodipine 10 mg daily  Continue lisinopril 10 mg daily along with Prinzide 20/12.5 mg daily  Limit sodium in diet to 2g/d  Initiate cardio exercise  Weight reduction  Increase water intake  Check BP and report if 3 consecutive readings greater than 139/89 to office

## 2024-11-07 NOTE — PROGRESS NOTES
\"Have you been to the ER, urgent care clinic since your last visit?  Hospitalized since your last visit?\"    NO    “Have you seen or consulted any other health care providers outside of [facility] since your last visit?”    NO    “Have you had a colorectal cancer screening such as a colonoscopy/FIT/Cologuard?    NO    No colonoscopy on file  No cologuard on file  No FIT/FOBT on file   No flexible sigmoidoscopy on file        Left shoulder worsening pain     
Indications: sprains and strains     No current facility-administered medications for this visit.       Past Surgical History:   Procedure Laterality Date    HEENT      ORTHOPEDIC SURGERY       Allergies and Intolerances:   Allergies   Allergen Reactions    Penicillins Anaphylaxis     As a child      Allopurinol Other (See Comments)     Joint pain - pt reports he can tolerate this medication, not an allergy 6/22/2022     Family History:   Family History   Problem Relation Age of Onset    Heart Disease Other         grandfather     Social History:   He  reports that he has been smoking cigarettes. He started smoking about 47 years ago. He has a 47.8 pack-year smoking history. He has been exposed to tobacco smoke. He has never used smokeless tobacco.   Social History     Substance and Sexual Activity   Alcohol Use Yes    Alcohol/week: 4.0 standard drinks of alcohol    Types: 4 Standard drinks or equivalent per week    Comment: twice a week     Immunization History:  Immunization History   Administered Date(s) Administered    Pneumococcal, PCV20, PREVNAR 20, (age 6w+), IM, 0.5mL 11/07/2024    TD 2LF, TDVAX, (age 7y+), IM, 0.5mL 05/09/2020         Return in about 6 months (around 5/7/2025) for HTN, Chol (BMP, Lipid).      Dr. Maureen Horn, AGNP-C, DNP  [facility]     The patient (or guardian, if applicable) and other individuals in attendance with the patient were advised that Artificial Intelligence will be utilized during this visit to record and process the conversation to generate a clinical note. The patient (or guardian, if applicable) and other individuals in attendance at the appointment consented to the use of AI, including the recording.

## 2025-01-29 DIAGNOSIS — I10 PRIMARY HYPERTENSION: ICD-10-CM

## 2025-01-29 RX ORDER — LISINOPRIL 10 MG/1
TABLET ORAL
Qty: 3 TABLET | OUTPATIENT
Start: 2025-01-29

## 2025-01-29 NOTE — TELEPHONE ENCOUNTER
Last OV: 11/7/2024  Next OV: 5/9/2025  Last refill: 11/7/2024       Please refuse medication.  Patient should have enough to last until appointment.

## 2025-04-03 ENCOUNTER — PATIENT MESSAGE (OUTPATIENT)
Facility: CLINIC | Age: 60
End: 2025-04-03

## 2025-04-03 RX ORDER — COLCHICINE 0.6 MG/1
TABLET ORAL
Qty: 11 TABLET | Refills: 0 | Status: SHIPPED | OUTPATIENT
Start: 2025-04-03

## 2025-04-03 NOTE — TELEPHONE ENCOUNTER
PCP: Maureen Horn DNP    LAST OFFICE VISIT: 11/07/2024    LAST REFILL PER CHART:  Medication:colchicine (COLCRYS) 0.6 MG tablet   Ordered On:11/07/2024  Instructions:Take 2 tabs now and then 1 tab 1 hour later, followed by 1 tab daily for 7 days   Dispense:11 tablets  Refills:1    Future Appointments   Date Time Provider Department Center   5/5/2025  7:45 AM [facility] LAB VISIT [facility] DEP   5/9/2025 11:30 AM Maureen Horn DNP [facility] DEP

## 2025-04-27 DIAGNOSIS — E78.2 MIXED HYPERLIPIDEMIA: ICD-10-CM

## 2025-04-28 RX ORDER — EZETIMIBE 10 MG/1
10 TABLET ORAL DAILY
Qty: 90 TABLET | Refills: 1 | OUTPATIENT
Start: 2025-04-28

## 2025-04-28 NOTE — TELEPHONE ENCOUNTER
Last Office visit:  11/7/2024    Last Filled: 11/7/2024; Qty 90 w/ 1 refill     Follow up visit:    Future Appointments   Date Time Provider Department Center   5/5/2025  7:45 AM [facility] LAB VISIT [facility] DEP   5/9/2025 11:30 AM Maureen Horn DNP [facility] DEP

## 2025-05-03 DIAGNOSIS — E78.2 MIXED HYPERLIPIDEMIA: ICD-10-CM

## 2025-05-03 DIAGNOSIS — I10 PRIMARY HYPERTENSION: ICD-10-CM

## 2025-05-05 ENCOUNTER — HOSPITAL ENCOUNTER (OUTPATIENT)
Facility: HOSPITAL | Age: 60
Setting detail: SPECIMEN
Discharge: HOME | End: 2025-05-08

## 2025-05-05 DIAGNOSIS — M1A.0720 CHRONIC GOUT OF LEFT FOOT, UNSPECIFIED CAUSE: ICD-10-CM

## 2025-05-05 DIAGNOSIS — Z12.5 SPECIAL SCREENING FOR MALIGNANT NEOPLASM OF PROSTATE: ICD-10-CM

## 2025-05-05 DIAGNOSIS — E78.2 MIXED HYPERLIPIDEMIA: ICD-10-CM

## 2025-05-05 DIAGNOSIS — I10 PRIMARY HYPERTENSION: ICD-10-CM

## 2025-05-05 LAB
ALBUMIN SERPL-MCNC: 3.9 G/DL (ref 3.4–5)
ALBUMIN/GLOB SERPL: 1.4 (ref 0.8–1.7)
ALP SERPL-CCNC: 67 U/L (ref 45–117)
ALT SERPL-CCNC: 31 U/L (ref 10–50)
ANION GAP SERPL CALC-SCNC: 14 MMOL/L (ref 3–18)
AST SERPL-CCNC: 22 U/L (ref 10–38)
BASOPHILS # BLD: 0.05 K/UL (ref 0–0.1)
BASOPHILS NFR BLD: 0.9 % (ref 0–2)
BILIRUB SERPL-MCNC: 0.6 MG/DL (ref 0.2–1)
BUN SERPL-MCNC: 10 MG/DL (ref 6–23)
BUN/CREAT SERPL: 11 (ref 12–20)
CALCIUM SERPL-MCNC: 9.5 MG/DL (ref 8.5–10.1)
CHLORIDE SERPL-SCNC: 101 MMOL/L (ref 98–107)
CHOLEST SERPL-MCNC: 136 MG/DL
CO2 SERPL-SCNC: 24 MMOL/L (ref 21–32)
CREAT SERPL-MCNC: 0.96 MG/DL (ref 0.6–1.3)
DIFFERENTIAL METHOD BLD: ABNORMAL
EOSINOPHIL # BLD: 0.39 K/UL (ref 0–0.4)
EOSINOPHIL NFR BLD: 6.6 % (ref 0–5)
ERYTHROCYTE [DISTWIDTH] IN BLOOD BY AUTOMATED COUNT: 12.6 % (ref 11.6–14.5)
GLOBULIN SER CALC-MCNC: 2.7 G/DL (ref 2–4)
GLUCOSE SERPL-MCNC: 101 MG/DL (ref 74–108)
HCT VFR BLD AUTO: 42.2 % (ref 36–48)
HDLC SERPL-MCNC: 39 MG/DL (ref 40–60)
HDLC SERPL: 3.5 (ref 0–5)
HGB BLD-MCNC: 13.8 G/DL (ref 13–16)
IMM GRANULOCYTES # BLD AUTO: 0.02 K/UL (ref 0–0.04)
IMM GRANULOCYTES NFR BLD AUTO: 0.3 % (ref 0–0.5)
LDLC SERPL CALC-MCNC: 34 MG/DL (ref 0–100)
LYMPHOCYTES # BLD: 1.65 K/UL (ref 0.9–3.6)
LYMPHOCYTES NFR BLD: 28.1 % (ref 21–52)
MCH RBC QN AUTO: 31 PG (ref 24–34)
MCHC RBC AUTO-ENTMCNC: 32.7 G/DL (ref 31–37)
MCV RBC AUTO: 94.8 FL (ref 78–100)
MONOCYTES # BLD: 0.55 K/UL (ref 0.05–1.2)
MONOCYTES NFR BLD: 9.4 % (ref 3–10)
NEUTS SEG # BLD: 3.21 K/UL (ref 1.8–8)
NEUTS SEG NFR BLD: 54.7 % (ref 40–73)
NRBC # BLD: 0 K/UL (ref 0–0.01)
NRBC BLD-RTO: 0 PER 100 WBC
PLATELET # BLD AUTO: 299 K/UL (ref 135–420)
PMV BLD AUTO: 9.5 FL (ref 9.2–11.8)
POTASSIUM SERPL-SCNC: 4.2 MMOL/L (ref 3.5–5.5)
PROT SERPL-MCNC: 6.6 G/DL (ref 6.4–8.2)
PSA SERPL-MCNC: 0.8 NG/ML (ref 1.4–4.4)
RBC # BLD AUTO: 4.45 M/UL (ref 4.35–5.65)
SODIUM SERPL-SCNC: 138 MMOL/L (ref 136–145)
TRIGL SERPL-MCNC: 318 MG/DL (ref 0–150)
URATE SERPL-MCNC: 6.3 MG/DL (ref 2.6–7.2)
VLDLC SERPL CALC-MCNC: 64 MG/DL
WBC # BLD AUTO: 5.9 K/UL (ref 4.6–13.2)

## 2025-05-05 PROCEDURE — 80053 COMPREHEN METABOLIC PANEL: CPT

## 2025-05-05 PROCEDURE — G0103 PSA SCREENING: HCPCS

## 2025-05-05 PROCEDURE — 84550 ASSAY OF BLOOD/URIC ACID: CPT

## 2025-05-05 PROCEDURE — 80061 LIPID PANEL: CPT

## 2025-05-05 PROCEDURE — 36415 COLL VENOUS BLD VENIPUNCTURE: CPT

## 2025-05-05 PROCEDURE — 85025 COMPLETE CBC W/AUTO DIFF WBC: CPT

## 2025-05-05 RX ORDER — LISINOPRIL AND HYDROCHLOROTHIAZIDE 12.5; 2 MG/1; MG/1
1 TABLET ORAL DAILY
Qty: 30 TABLET | Refills: 0 | Status: SHIPPED | OUTPATIENT
Start: 2025-05-05

## 2025-05-05 RX ORDER — ATORVASTATIN CALCIUM 40 MG/1
40 TABLET, FILM COATED ORAL DAILY
Qty: 30 TABLET | Refills: 0 | Status: SHIPPED | OUTPATIENT
Start: 2025-05-05

## 2025-05-05 RX ORDER — LISINOPRIL 10 MG/1
TABLET ORAL
Qty: 30 TABLET | Refills: 0 | Status: SHIPPED | OUTPATIENT
Start: 2025-05-05

## 2025-05-05 RX ORDER — EZETIMIBE 10 MG/1
10 TABLET ORAL DAILY
Qty: 30 TABLET | Refills: 0 | Status: SHIPPED | OUTPATIENT
Start: 2025-05-05

## 2025-05-05 NOTE — TELEPHONE ENCOUNTER
Last Office visit:  11/7/2024    Last Filled: 11/7/2024; Qty 90 w/ 1 refill    Follow up visit:    Future Appointments   Date Time Provider Department Center   5/21/2025  2:45 PM Maureen Horn DNP [facility] DEP

## 2025-05-21 ENCOUNTER — OFFICE VISIT (OUTPATIENT)
Facility: CLINIC | Age: 60
End: 2025-05-21

## 2025-05-21 VITALS
WEIGHT: 257 LBS | RESPIRATION RATE: 16 BRPM | OXYGEN SATURATION: 97 % | BODY MASS INDEX: 32.98 KG/M2 | TEMPERATURE: 98.3 F | SYSTOLIC BLOOD PRESSURE: 134 MMHG | HEART RATE: 56 BPM | HEIGHT: 74 IN | DIASTOLIC BLOOD PRESSURE: 83 MMHG

## 2025-05-21 DIAGNOSIS — I10 PRIMARY HYPERTENSION: Primary | ICD-10-CM

## 2025-05-21 DIAGNOSIS — E78.2 MIXED HYPERLIPIDEMIA: ICD-10-CM

## 2025-05-21 DIAGNOSIS — L55.9 SUNBURN: ICD-10-CM

## 2025-05-21 DIAGNOSIS — M10.00 ACUTE IDIOPATHIC GOUT, UNSPECIFIED SITE: ICD-10-CM

## 2025-05-21 DIAGNOSIS — M1A.0720 CHRONIC GOUT OF LEFT FOOT, UNSPECIFIED CAUSE: ICD-10-CM

## 2025-05-21 PROCEDURE — 99214 OFFICE O/P EST MOD 30 MIN: CPT | Performed by: NURSE PRACTITIONER

## 2025-05-21 PROCEDURE — 3079F DIAST BP 80-89 MM HG: CPT | Performed by: NURSE PRACTITIONER

## 2025-05-21 PROCEDURE — 3075F SYST BP GE 130 - 139MM HG: CPT | Performed by: NURSE PRACTITIONER

## 2025-05-21 RX ORDER — ATORVASTATIN CALCIUM 40 MG/1
40 TABLET, FILM COATED ORAL DAILY
Qty: 90 TABLET | Refills: 1 | Status: SHIPPED | OUTPATIENT
Start: 2025-05-21

## 2025-05-21 RX ORDER — COLCHICINE 0.6 MG/1
TABLET ORAL
Qty: 11 TABLET | Refills: 2 | Status: SHIPPED | OUTPATIENT
Start: 2025-05-21

## 2025-05-21 RX ORDER — EZETIMIBE 10 MG/1
10 TABLET ORAL DAILY
Qty: 90 TABLET | Refills: 1 | Status: SHIPPED | OUTPATIENT
Start: 2025-05-21

## 2025-05-21 RX ORDER — AMLODIPINE BESYLATE 10 MG/1
10 TABLET ORAL DAILY
Qty: 90 TABLET | Refills: 1 | Status: SHIPPED | OUTPATIENT
Start: 2025-05-21

## 2025-05-21 RX ORDER — LISINOPRIL 40 MG/1
40 TABLET ORAL DAILY
Qty: 90 TABLET | Refills: 1 | Status: SHIPPED | OUTPATIENT
Start: 2025-05-21

## 2025-05-21 RX ORDER — FENOFIBRATE 145 MG/1
145 TABLET, FILM COATED ORAL DAILY
Qty: 90 TABLET | Refills: 1 | Status: SHIPPED | OUTPATIENT
Start: 2025-05-21

## 2025-05-21 RX ORDER — ALLOPURINOL 300 MG/1
300 TABLET ORAL DAILY
Qty: 90 TABLET | Refills: 1 | Status: SHIPPED | OUTPATIENT
Start: 2025-05-21

## 2025-05-21 SDOH — ECONOMIC STABILITY: FOOD INSECURITY: WITHIN THE PAST 12 MONTHS, THE FOOD YOU BOUGHT JUST DIDN'T LAST AND YOU DIDN'T HAVE MONEY TO GET MORE.: PATIENT DECLINED

## 2025-05-21 SDOH — ECONOMIC STABILITY: FOOD INSECURITY: WITHIN THE PAST 12 MONTHS, YOU WORRIED THAT YOUR FOOD WOULD RUN OUT BEFORE YOU GOT MONEY TO BUY MORE.: PATIENT DECLINED

## 2025-05-21 ASSESSMENT — PATIENT HEALTH QUESTIONNAIRE - PHQ9
SUM OF ALL RESPONSES TO PHQ QUESTIONS 1-9: 0
2. FEELING DOWN, DEPRESSED OR HOPELESS: NOT AT ALL
SUM OF ALL RESPONSES TO PHQ QUESTIONS 1-9: 0
1. LITTLE INTEREST OR PLEASURE IN DOING THINGS: NOT AT ALL

## 2025-05-21 NOTE — PROGRESS NOTES
[facility] [id number] [facility]  [address]  [place] [id number]  [phone] office/[phone] fax    5/21/2025    Gary Pitt III 1965 is a pleasant White (non-) male.       History of Present Illness  The patient presents for evaluation of gout, hyperlipidemia, hypertension, and sunburn.    He reports adherence to his medication regimen, including allopurinol 100 mg twice daily, which he has increased to three tablets daily due to the onset of joint pain in his foot and hand. He also takes colchicine during severe pain episodes accompanied by swelling. He has been maintaining adequate hydration.    He has been diligent in taking his cholesterol-lowering medications and has made dietary modifications, including eliminating sweet tea from his diet for the past three months and increasing his water intake. Despite these changes, he continues to consume 14 to 16 cans of beer on weekends, with a frequency of every two to three days. He has expressed interest in receiving a weight loss injection.    His blood pressure is well-managed with his current medication regimen.     He reports soreness in the soles of his feet, which he attributes to his footwear and the nature of his work, which involves walking on uneven surfaces such as concrete, rock, and bushes. He has been using sole cushions for comfort.     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    SOCIAL HISTORY  The patient admits to drinking alcohol, specifically beer, on the weekends, consuming approximately 14-16 beers over two nights.      Physical Exam      Physical Exam  Constitutional:       Appearance: Normal appearance. He is obese.   HENT:      Head: Normocephalic and atraumatic.      Right Ear: Tympanic membrane, ear canal and external ear normal.      Left Ear: Tympanic membrane, ear canal and external ear normal.      Nose: Nose normal.

## 2025-05-21 NOTE — PROGRESS NOTES
Gary Pitt III is a 59 y.o. male (: 1965) presenting to address:    Chief Complaint   Patient presents with    6 Month Follow-Up       Vitals:    25 1427   BP: 134/83   Pulse: 56   Resp: 16   Temp: 98.3 °F (36.8 °C)   SpO2: 97%       \"Have you been to the ER, urgent care clinic since your last visit?  Hospitalized since your last visit?\"    NO    “Have you seen or consulted any other health care providers outside of [facility] since your last visit?”    NO    “Have you had a colorectal cancer screening such as a colonoscopy/FIT/Cologuard?    NO    No colonoscopy on file  No cologuard on file  No FIT/FOBT on file   No flexible sigmoidoscopy on file

## 2025-05-21 NOTE — ASSESSMENT & PLAN NOTE
- He is advised to wear protective clothing, including sleeves and a hat, to prevent further sunburn and reduce the risk of skin cancer.  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

## 2025-05-21 NOTE — ASSESSMENT & PLAN NOTE
- His blood pressure is well-controlled on his current medication regimen.  - Patient is currently taking lisinopril 10 mg daily along with Prinzide 20/12.5 mg daily  - DC lisinopril 10 mg  - DC Prinzide 20/12.5 mg  - Initiate lisinopril 40 mg daily  - Continue amlodipine 10 mg daily   - I suspect patient's HCTZ may be part of the problem with gout flares  - Due to discontinuing the HCTZ portion of his BP medication, I have increased the lisinopril from 30 mg to 40 mg.  - He is advised to monitor his blood pressure at home due to the recent changes in his medication regimen.  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

## 2025-05-21 NOTE — ASSESSMENT & PLAN NOTE
- His uric acid level is currently at 6.3.  - DC allopurinol 100 mg 2 tabs daily   - Initiate allopurinol 300 mg 1 tab daily  - Colchicine will be refilled for use as needed during acute gout attacks.  - He is advised to discontinue the use of hydrochlorothiazide as it may contribute to gout flares.  - He was strongly encouraged never to increase his medication without speaking to the provider first  - Again educated patient that his alcohol consumption may be majority of the problem of his gout flares.  Patient consumes between 14 and 16 beers in a weekend.    5/2/2024:  The patient was informed that HCTZ, a part of his antihypertensive medication, can induce gout flares, including chocolate, red meat, and alcohol consumption. The patient was advised to maintain his colchicine 0.6 mg for emergency flare-ups. A prescription for allopurinol 100 mg daily was issued. The patient was advised to reduce his allopurinol dosage to half a tablet if he experiences joint pain, while maintaining the 100 mg dosage if no joint pain occurs. Should the patient experience another gout flare-up while on allopurinol 100 mg, the dosage may be increased by the patient to 200 mg and inform me of the dose change.  Obtain uric acid level 6 months per patient request

## 2025-05-21 NOTE — ASSESSMENT & PLAN NOTE
-  - 318, LDL is at 34.  - Continue atorvastatin 40 mg daily   - Continue Zetia 10 mg daily   - Due to triglycerides remain elevated, initiate fenofibrate 145 mg daily .  - Good Rx coupon provided to the patient  - Strongly encourage patient to reduce his consumption of alcohol as this may be partially the problem with his elevated cholesterol  - Reduce only foods in diet  - A lipid panel will be ordered at the end of September to monitor his response to the new medication.  - Recheck level 5 months

## 2025-06-20 ENCOUNTER — HOSPITAL ENCOUNTER (OUTPATIENT)
Age: 60
Discharge: HOME | End: 2025-06-23

## 2025-06-20 DIAGNOSIS — Z87.891 PERSONAL HISTORY OF TOBACCO USE: ICD-10-CM

## 2025-06-20 PROCEDURE — 71271 CT THORAX LUNG CANCER SCR C-: CPT

## 2025-06-26 ENCOUNTER — RESULTS FOLLOW-UP (OUTPATIENT)
Facility: CLINIC | Age: 60
End: 2025-06-26

## 2025-06-26 DIAGNOSIS — Z87.891 PERSONAL HISTORY OF TOBACCO USE: Primary | ICD-10-CM
